# Patient Record
Sex: MALE | Race: WHITE | NOT HISPANIC OR LATINO | URBAN - METROPOLITAN AREA
[De-identification: names, ages, dates, MRNs, and addresses within clinical notes are randomized per-mention and may not be internally consistent; named-entity substitution may affect disease eponyms.]

---

## 2018-07-07 ENCOUNTER — EMERGENCY (EMERGENCY)
Facility: HOSPITAL | Age: 23
LOS: 1 days | Discharge: HOME | End: 2018-07-07
Attending: EMERGENCY MEDICINE
Payer: COMMERCIAL

## 2018-07-07 VITALS
HEART RATE: 53 BPM | SYSTOLIC BLOOD PRESSURE: 118 MMHG | OXYGEN SATURATION: 100 % | DIASTOLIC BLOOD PRESSURE: 71 MMHG | RESPIRATION RATE: 18 BRPM | TEMPERATURE: 98 F

## 2018-07-07 VITALS
RESPIRATION RATE: 18 BRPM | DIASTOLIC BLOOD PRESSURE: 73 MMHG | HEART RATE: 55 BPM | TEMPERATURE: 98 F | WEIGHT: 210.1 LBS | OXYGEN SATURATION: 99 % | SYSTOLIC BLOOD PRESSURE: 122 MMHG | HEIGHT: 69 IN

## 2018-07-07 LAB
ALBUMIN SERPL ELPH-MCNC: 4.7 G/DL — SIGNIFICANT CHANGE UP (ref 3.3–5)
ALP SERPL-CCNC: 97 U/L — SIGNIFICANT CHANGE UP (ref 40–120)
ALT FLD-CCNC: 23 U/L — SIGNIFICANT CHANGE UP (ref 10–45)
ANION GAP SERPL CALC-SCNC: 14 MMOL/L — SIGNIFICANT CHANGE UP (ref 5–17)
AST SERPL-CCNC: 19 U/L — SIGNIFICANT CHANGE UP (ref 10–40)
BASOPHILS # BLD AUTO: 0 K/UL — SIGNIFICANT CHANGE UP (ref 0–0.2)
BASOPHILS NFR BLD AUTO: 0.6 % — SIGNIFICANT CHANGE UP (ref 0–2)
BILIRUB SERPL-MCNC: 1.1 MG/DL — SIGNIFICANT CHANGE UP (ref 0.2–1.2)
BUN SERPL-MCNC: 15 MG/DL — SIGNIFICANT CHANGE UP (ref 7–23)
CALCIUM SERPL-MCNC: 9.7 MG/DL — SIGNIFICANT CHANGE UP (ref 8.4–10.5)
CHLORIDE SERPL-SCNC: 102 MMOL/L — SIGNIFICANT CHANGE UP (ref 96–108)
CO2 SERPL-SCNC: 25 MMOL/L — SIGNIFICANT CHANGE UP (ref 22–31)
CREAT SERPL-MCNC: 1.12 MG/DL — SIGNIFICANT CHANGE UP (ref 0.5–1.3)
EOSINOPHIL # BLD AUTO: 0.3 K/UL — SIGNIFICANT CHANGE UP (ref 0–0.5)
EOSINOPHIL NFR BLD AUTO: 3.3 % — SIGNIFICANT CHANGE UP (ref 0–6)
GLUCOSE SERPL-MCNC: 85 MG/DL — SIGNIFICANT CHANGE UP (ref 70–99)
HCT VFR BLD CALC: 44.9 % — SIGNIFICANT CHANGE UP (ref 39–50)
HGB BLD-MCNC: 15.7 G/DL — SIGNIFICANT CHANGE UP (ref 13–17)
LIDOCAIN IGE QN: 16 U/L — SIGNIFICANT CHANGE UP (ref 7–60)
LYMPHOCYTES # BLD AUTO: 2.6 K/UL — SIGNIFICANT CHANGE UP (ref 1–3.3)
LYMPHOCYTES # BLD AUTO: 32.3 % — SIGNIFICANT CHANGE UP (ref 13–44)
MCHC RBC-ENTMCNC: 31.8 PG — SIGNIFICANT CHANGE UP (ref 27–34)
MCHC RBC-ENTMCNC: 35 GM/DL — SIGNIFICANT CHANGE UP (ref 32–36)
MCV RBC AUTO: 91 FL — SIGNIFICANT CHANGE UP (ref 80–100)
MONOCYTES # BLD AUTO: 0.5 K/UL — SIGNIFICANT CHANGE UP (ref 0–0.9)
MONOCYTES NFR BLD AUTO: 6.5 % — SIGNIFICANT CHANGE UP (ref 2–14)
NEUTROPHILS # BLD AUTO: 4.7 K/UL — SIGNIFICANT CHANGE UP (ref 1.8–7.4)
NEUTROPHILS NFR BLD AUTO: 57.3 % — SIGNIFICANT CHANGE UP (ref 43–77)
PLATELET # BLD AUTO: 192 K/UL — SIGNIFICANT CHANGE UP (ref 150–400)
POTASSIUM SERPL-MCNC: 3.6 MMOL/L — SIGNIFICANT CHANGE UP (ref 3.5–5.3)
POTASSIUM SERPL-SCNC: 3.6 MMOL/L — SIGNIFICANT CHANGE UP (ref 3.5–5.3)
PROT SERPL-MCNC: 7.7 G/DL — SIGNIFICANT CHANGE UP (ref 6–8.3)
RBC # BLD: 4.94 M/UL — SIGNIFICANT CHANGE UP (ref 4.2–5.8)
RBC # FLD: 11 % — SIGNIFICANT CHANGE UP (ref 10.3–14.5)
SODIUM SERPL-SCNC: 141 MMOL/L — SIGNIFICANT CHANGE UP (ref 135–145)
WBC # BLD: 8.2 K/UL — SIGNIFICANT CHANGE UP (ref 3.8–10.5)
WBC # FLD AUTO: 8.2 K/UL — SIGNIFICANT CHANGE UP (ref 3.8–10.5)

## 2018-07-07 PROCEDURE — 99284 EMERGENCY DEPT VISIT MOD MDM: CPT | Mod: 25

## 2018-07-07 PROCEDURE — 99284 EMERGENCY DEPT VISIT MOD MDM: CPT

## 2018-07-07 PROCEDURE — 96374 THER/PROPH/DIAG INJ IV PUSH: CPT

## 2018-07-07 PROCEDURE — 85027 COMPLETE CBC AUTOMATED: CPT

## 2018-07-07 PROCEDURE — 80053 COMPREHEN METABOLIC PANEL: CPT

## 2018-07-07 PROCEDURE — 83690 ASSAY OF LIPASE: CPT

## 2018-07-07 PROCEDURE — 96375 TX/PRO/DX INJ NEW DRUG ADDON: CPT

## 2018-07-07 RX ORDER — PANTOPRAZOLE SODIUM 20 MG/1
40 TABLET, DELAYED RELEASE ORAL ONCE
Qty: 0 | Refills: 0 | Status: COMPLETED | OUTPATIENT
Start: 2018-07-07 | End: 2018-07-07

## 2018-07-07 RX ORDER — ONDANSETRON 8 MG/1
4 TABLET, FILM COATED ORAL ONCE
Qty: 0 | Refills: 0 | Status: COMPLETED | OUTPATIENT
Start: 2018-07-07 | End: 2018-07-07

## 2018-07-07 RX ORDER — FAMOTIDINE 10 MG/ML
20 INJECTION INTRAVENOUS ONCE
Qty: 0 | Refills: 0 | Status: COMPLETED | OUTPATIENT
Start: 2018-07-07 | End: 2018-07-07

## 2018-07-07 RX ORDER — SODIUM CHLORIDE 9 MG/ML
2000 INJECTION INTRAMUSCULAR; INTRAVENOUS; SUBCUTANEOUS ONCE
Qty: 0 | Refills: 0 | Status: COMPLETED | OUTPATIENT
Start: 2018-07-07 | End: 2018-07-07

## 2018-07-07 RX ADMIN — ONDANSETRON 4 MILLIGRAM(S): 8 TABLET, FILM COATED ORAL at 17:25

## 2018-07-07 RX ADMIN — PANTOPRAZOLE SODIUM 40 MILLIGRAM(S): 20 TABLET, DELAYED RELEASE ORAL at 17:55

## 2018-07-07 RX ADMIN — SODIUM CHLORIDE 2000 MILLILITER(S): 9 INJECTION INTRAMUSCULAR; INTRAVENOUS; SUBCUTANEOUS at 17:26

## 2018-07-07 RX ADMIN — FAMOTIDINE 20 MILLIGRAM(S): 10 INJECTION INTRAVENOUS at 17:25

## 2018-07-07 NOTE — ED PROVIDER NOTE - PLAN OF CARE
1) Please return to the ED should you have any new or worsening symptoms, worsening pain, develop fever or any concerning symptoms  2) Please follow up with gastroenterology in 3-4 days. Please call [phone] to make an appointment.   3) Please  Prilosec from pharmacy - please take as directed for 2 weeks. Please discuss continuing this medication with your primary doctor.

## 2018-07-07 NOTE — ED ADULT NURSE REASSESSMENT NOTE - NS ED NURSE REASSESS COMMENT FT1
Patient states that he is feelig better. PO challenge requested by MD Chao. Second liter of NS infusing. Will continue to monitor.

## 2018-07-07 NOTE — ED PROVIDER NOTE - PROGRESS NOTE DETAILS
Appendicitis precautions discussed with patie/ and or family. Pt aware of s/s for return visit. The patient was re-examined after interventions and is feeling much better.  The patient will follow up with their primary physician this week. Appendicitis precautions discussed with patient. Pt aware of s/s for return visit.

## 2018-07-07 NOTE — ED PROVIDER NOTE - CARE PLAN
Principal Discharge DX:	Nausea and vomiting, intractability of vomiting not specified, unspecified vomiting type Principal Discharge DX:	Nausea and vomiting, intractability of vomiting not specified, unspecified vomiting type  Assessment and plan of treatment:	1) Please return to the ED should you have any new or worsening symptoms, worsening pain, develop fever or any concerning symptoms  2) Please follow up with gastroenterology in 3-4 days. Please call [phone] to make an appointment.   3) Please  Prilosec from pharmacy - please take as directed for 2 weeks. Please discuss continuing this medication with your primary doctor.

## 2018-07-07 NOTE — ED PROVIDER NOTE - MEDICAL DECISION MAKING DETAILS
Stephen Barrett (Resident): 21 y/o male p/w 2-3 weeks of abd pain and early satiety, now with vomiting for 1 week - looks well, no infectious symptoms like fever, blood in stool - abd benign w/ some mild abd tenderness in lower quadrants - doesn't smoke marijuana regularly, low suspicion for cyclic vomiting or hyperemesis canabinoid - d/w patient will need GI follow up, and given hx and exam, no need for CT at this time - will check labs, hydrate, symptoms control, and reassess Stephen Barrett (Resident): 21 y/o male p/w 2-3 weeks of abd pain and early satiety, now with vomiting for 1 week - looks well, no infectious symptoms like fever, blood in stool - abd benign w/ some mild abd tenderness in lower quadrants - doesn't smoke marijuana regularly, low suspicion for cyclic vomiting or hyperemesis canabinoid - d/w patient will need GI follow up, and given hx and exam, no need for CT at this time - will check labs, hydrate, symptoms control, and reassess    fransisca 3 weeks of dec appetite and early  satiety  now with 1.5 weeks of post prandial vomiting and abd pain intermittent -abd nonsurgical - pe and history not c/w appy - 1 episode of blood streaked vomit - possible gastritis - will need gi fu - ck lfts and lipase r/o hepatitis and pancreatitis - iv fluids antiemetics and symptom control will dc on h2 blocker

## 2018-07-07 NOTE — ED ADULT NURSE NOTE - OBJECTIVE STATEMENT
Pt is an ambulatory 22 yr old male a/oX 3 c/o abdominal pain and vomitting after eating for 3 weeks.  Pt has not had any workup outpatient.  Perrl wnl, flores with equal strength.  LUNGS CTA no chest pain or sob.  Pt states he vomits after eating and had blood in vomit once last week.  No fevers, chills.  Abdomen ND, tender in RLQ with BS+4Q.  No CVA tenderness.  No urinary symptoms.  SKIN WDI.  Peripheral pulses +2bl no edema

## 2018-07-07 NOTE — ED PROVIDER NOTE - OBJECTIVE STATEMENT
21 y/o male hx of asthma presents with abd pain. Per patient for past 2-3 weeks been having early satiety, abd pain, diffuse. States 1 week ago he started developing some vomiting with meals. Currently has no pain. No hx of abd surgeries in the past. No diarrhea, fever, chills. Normal BM. Does report occasional blood streaked vomiting. Has not taken anything for symptoms. Never seen by GI. States smokes marijuana, but very rarely. Not a drinker.

## 2018-07-07 NOTE — ED PROVIDER NOTE - CONSTITUTIONAL, MLM
normal... Well appearing, well nourished, awake, alert, oriented to person, place, time/situation and in no apparent distress. Not actively retching/vomiting.

## 2018-11-24 ENCOUNTER — EMERGENCY (EMERGENCY)
Facility: HOSPITAL | Age: 23
LOS: 1 days | Discharge: HOME | End: 2018-11-24
Attending: EMERGENCY MEDICINE
Payer: COMMERCIAL

## 2018-11-24 VITALS
SYSTOLIC BLOOD PRESSURE: 114 MMHG | TEMPERATURE: 98 F | RESPIRATION RATE: 16 BRPM | OXYGEN SATURATION: 100 % | DIASTOLIC BLOOD PRESSURE: 64 MMHG | HEART RATE: 65 BPM

## 2018-11-24 VITALS
WEIGHT: 195.11 LBS | HEIGHT: 69 IN | DIASTOLIC BLOOD PRESSURE: 84 MMHG | SYSTOLIC BLOOD PRESSURE: 135 MMHG | HEART RATE: 98 BPM | RESPIRATION RATE: 18 BRPM

## 2018-11-24 LAB
ALBUMIN SERPL ELPH-MCNC: 4.9 G/DL — SIGNIFICANT CHANGE UP (ref 3.3–5)
ALP SERPL-CCNC: 94 U/L — SIGNIFICANT CHANGE UP (ref 40–120)
ALT FLD-CCNC: 30 U/L — SIGNIFICANT CHANGE UP (ref 10–45)
ANION GAP SERPL CALC-SCNC: 19 MMOL/L — HIGH (ref 5–17)
AST SERPL-CCNC: 41 U/L — HIGH (ref 10–40)
BASOPHILS # BLD AUTO: 0.1 K/UL — SIGNIFICANT CHANGE UP (ref 0–0.2)
BASOPHILS NFR BLD AUTO: 0.8 % — SIGNIFICANT CHANGE UP (ref 0–2)
BILIRUB SERPL-MCNC: 1.1 MG/DL — SIGNIFICANT CHANGE UP (ref 0.2–1.2)
BUN SERPL-MCNC: 23 MG/DL — SIGNIFICANT CHANGE UP (ref 7–23)
CALCIUM SERPL-MCNC: 9.8 MG/DL — SIGNIFICANT CHANGE UP (ref 8.4–10.5)
CHLORIDE SERPL-SCNC: 101 MMOL/L — SIGNIFICANT CHANGE UP (ref 96–108)
CO2 SERPL-SCNC: 20 MMOL/L — LOW (ref 22–31)
CREAT SERPL-MCNC: 0.93 MG/DL — SIGNIFICANT CHANGE UP (ref 0.5–1.3)
EOSINOPHIL # BLD AUTO: 0.1 K/UL — SIGNIFICANT CHANGE UP (ref 0–0.5)
EOSINOPHIL NFR BLD AUTO: 1.4 % — SIGNIFICANT CHANGE UP (ref 0–6)
GAS PNL BLDV: SIGNIFICANT CHANGE UP
GLUCOSE SERPL-MCNC: 69 MG/DL — LOW (ref 70–99)
HCT VFR BLD CALC: 49.7 % — SIGNIFICANT CHANGE UP (ref 39–50)
HGB BLD-MCNC: 16.8 G/DL — SIGNIFICANT CHANGE UP (ref 13–17)
LIDOCAIN IGE QN: 24 U/L — SIGNIFICANT CHANGE UP (ref 7–60)
LYMPHOCYTES # BLD AUTO: 2.2 K/UL — SIGNIFICANT CHANGE UP (ref 1–3.3)
LYMPHOCYTES # BLD AUTO: 27.1 % — SIGNIFICANT CHANGE UP (ref 13–44)
MCHC RBC-ENTMCNC: 29.9 PG — SIGNIFICANT CHANGE UP (ref 27–34)
MCHC RBC-ENTMCNC: 33.8 GM/DL — SIGNIFICANT CHANGE UP (ref 32–36)
MCV RBC AUTO: 88.4 FL — SIGNIFICANT CHANGE UP (ref 80–100)
MONOCYTES # BLD AUTO: 0.5 K/UL — SIGNIFICANT CHANGE UP (ref 0–0.9)
MONOCYTES NFR BLD AUTO: 6.4 % — SIGNIFICANT CHANGE UP (ref 2–14)
NEUTROPHILS # BLD AUTO: 5.2 K/UL — SIGNIFICANT CHANGE UP (ref 1.8–7.4)
NEUTROPHILS NFR BLD AUTO: 64.3 % — SIGNIFICANT CHANGE UP (ref 43–77)
PLATELET # BLD AUTO: 223 K/UL — SIGNIFICANT CHANGE UP (ref 150–400)
POTASSIUM SERPL-MCNC: 6.3 MMOL/L — CRITICAL HIGH (ref 3.5–5.3)
POTASSIUM SERPL-SCNC: 6.3 MMOL/L — CRITICAL HIGH (ref 3.5–5.3)
PROT SERPL-MCNC: 8.5 G/DL — HIGH (ref 6–8.3)
RBC # BLD: 5.62 M/UL — SIGNIFICANT CHANGE UP (ref 4.2–5.8)
RBC # FLD: 12 % — SIGNIFICANT CHANGE UP (ref 10.3–14.5)
SODIUM SERPL-SCNC: 140 MMOL/L — SIGNIFICANT CHANGE UP (ref 135–145)
WBC # BLD: 8.2 K/UL — SIGNIFICANT CHANGE UP (ref 3.8–10.5)
WBC # FLD AUTO: 8.2 K/UL — SIGNIFICANT CHANGE UP (ref 3.8–10.5)

## 2018-11-24 PROCEDURE — 82435 ASSAY OF BLOOD CHLORIDE: CPT

## 2018-11-24 PROCEDURE — 96374 THER/PROPH/DIAG INJ IV PUSH: CPT

## 2018-11-24 PROCEDURE — 83690 ASSAY OF LIPASE: CPT

## 2018-11-24 PROCEDURE — 85014 HEMATOCRIT: CPT

## 2018-11-24 PROCEDURE — 96375 TX/PRO/DX INJ NEW DRUG ADDON: CPT

## 2018-11-24 PROCEDURE — 71045 X-RAY EXAM CHEST 1 VIEW: CPT | Mod: 26

## 2018-11-24 PROCEDURE — 82803 BLOOD GASES ANY COMBINATION: CPT

## 2018-11-24 PROCEDURE — 71045 X-RAY EXAM CHEST 1 VIEW: CPT

## 2018-11-24 PROCEDURE — 84132 ASSAY OF SERUM POTASSIUM: CPT

## 2018-11-24 PROCEDURE — 82947 ASSAY GLUCOSE BLOOD QUANT: CPT

## 2018-11-24 PROCEDURE — 99284 EMERGENCY DEPT VISIT MOD MDM: CPT

## 2018-11-24 PROCEDURE — 85027 COMPLETE CBC AUTOMATED: CPT

## 2018-11-24 PROCEDURE — 80053 COMPREHEN METABOLIC PANEL: CPT

## 2018-11-24 PROCEDURE — 84295 ASSAY OF SERUM SODIUM: CPT

## 2018-11-24 PROCEDURE — 99284 EMERGENCY DEPT VISIT MOD MDM: CPT | Mod: 25

## 2018-11-24 PROCEDURE — 82330 ASSAY OF CALCIUM: CPT

## 2018-11-24 PROCEDURE — 83605 ASSAY OF LACTIC ACID: CPT

## 2018-11-24 PROCEDURE — 96361 HYDRATE IV INFUSION ADD-ON: CPT

## 2018-11-24 RX ORDER — SUCRALFATE 1 G
1 TABLET ORAL ONCE
Qty: 0 | Refills: 0 | Status: COMPLETED | OUTPATIENT
Start: 2018-11-24 | End: 2018-11-24

## 2018-11-24 RX ORDER — SODIUM CHLORIDE 9 MG/ML
1000 INJECTION, SOLUTION INTRAVENOUS
Qty: 0 | Refills: 0 | Status: DISCONTINUED | OUTPATIENT
Start: 2018-11-24 | End: 2018-11-28

## 2018-11-24 RX ORDER — SUCRALFATE 1 G
10 TABLET ORAL
Qty: 500 | Refills: 0
Start: 2018-11-24

## 2018-11-24 RX ORDER — SODIUM CHLORIDE 9 MG/ML
1000 INJECTION INTRAMUSCULAR; INTRAVENOUS; SUBCUTANEOUS ONCE
Qty: 0 | Refills: 0 | Status: COMPLETED | OUTPATIENT
Start: 2018-11-24 | End: 2018-11-24

## 2018-11-24 RX ORDER — OMEPRAZOLE 10 MG/1
1 CAPSULE, DELAYED RELEASE ORAL
Qty: 30 | Refills: 0
Start: 2018-11-24 | End: 2018-12-23

## 2018-11-24 RX ORDER — ONDANSETRON 8 MG/1
4 TABLET, FILM COATED ORAL ONCE
Qty: 0 | Refills: 0 | Status: COMPLETED | OUTPATIENT
Start: 2018-11-24 | End: 2018-11-24

## 2018-11-24 RX ORDER — ONDANSETRON 8 MG/1
1 TABLET, FILM COATED ORAL
Qty: 12 | Refills: 0
Start: 2018-11-24

## 2018-11-24 RX ORDER — FAMOTIDINE 10 MG/ML
20 INJECTION INTRAVENOUS ONCE
Qty: 0 | Refills: 0 | Status: COMPLETED | OUTPATIENT
Start: 2018-11-24 | End: 2018-11-24

## 2018-11-24 RX ORDER — FAMOTIDINE 10 MG/ML
1 INJECTION INTRAVENOUS
Qty: 28 | Refills: 0
Start: 2018-11-24 | End: 2018-12-07

## 2018-11-24 RX ADMIN — SODIUM CHLORIDE 1000 MILLILITER(S): 9 INJECTION INTRAMUSCULAR; INTRAVENOUS; SUBCUTANEOUS at 20:08

## 2018-11-24 RX ADMIN — ONDANSETRON 4 MILLIGRAM(S): 8 TABLET, FILM COATED ORAL at 18:27

## 2018-11-24 RX ADMIN — SODIUM CHLORIDE 1000 MILLILITER(S): 9 INJECTION INTRAMUSCULAR; INTRAVENOUS; SUBCUTANEOUS at 19:48

## 2018-11-24 RX ADMIN — FAMOTIDINE 20 MILLIGRAM(S): 10 INJECTION INTRAVENOUS at 18:29

## 2018-11-24 RX ADMIN — SODIUM CHLORIDE 1000 MILLILITER(S): 9 INJECTION INTRAMUSCULAR; INTRAVENOUS; SUBCUTANEOUS at 18:27

## 2018-11-24 RX ADMIN — SODIUM CHLORIDE 150 MILLILITER(S): 9 INJECTION, SOLUTION INTRAVENOUS at 20:04

## 2018-11-24 RX ADMIN — Medication 1 GRAM(S): at 20:11

## 2018-11-24 NOTE — ED ADULT NURSE NOTE - OBJECTIVE STATEMENT
pt has been vomiting for 36 hours.  he had fever a day ago but none now.  he has a history  of asthma and dehydration.  refill wdl

## 2018-11-24 NOTE — ED PROVIDER NOTE - ATTENDING CONTRIBUTION TO CARE
22y/o M with h/o asthma, gastritis, c/o worsening epigastric abdominal pain, nausea, vomiting and inability to tolerate even small amount of water over the past 4 days.  Any po intake reportedly causing pain and vomiting since yesterday.  Had previously some chills and diarrhea few days ago prior to other symptom onset.  No known sick contacts.  No recent travel.  No medication use (was previously on PPI for gastritis after EGD by his GI doctor diagnosed gastritis, but d/c'd after became asymptomatic for few months and clear by GI).      On Physical Exam:  General: appears clinically dehydrated (dry lips/mouth) but in NAD, speaking clearly in full sentences and without difficulty; cooperative with exam  HEENT: PERRL, dry lips, oropharynx clear, mild erythema to posterior oropharynx (likely irritant from vomiting) without exudates, tonsillar enlargement or vesicles  Neck: no neck tenderness, no nuchal rigidity  Cardiac: normal s1, s2; RRR; no MGR  Lungs: CTABL  Abdomen: mild epigastric tenderness without rebound or guarding, no RUQ tenderness, negative erickson's sign; normal BS in all 4 quadrants.  : no bladder tenderness or distension  Skin: intact, no rash  Extremities: no peripheral edema, no gross deformities  Neuro: no gross neurologic deficits     AP: Likely gastritis; will check labs: cbc (r/o anemia which would suggest bleeding gastric/peptic ulcer), cmp, lipase (r/o pancreatitis); obtain CXR to eval for pneumomediastinum given repeated vomiting, and will give medications (iv fluids for dehydration, GI medications) and reassess.     ED Course: Patient remained hemodynamically stable, and reported feeling much better after medication.  Additionally, labs notable for mild hyperkalemia on CMP (hemolyzed) with normal renal function and K 3.4 on VBG (likely not truly hyperkalemic).  CXR shows no evidence of pneumomediastinum.   Mild AG elevation and decreased bicarb c/w dehydration and poor po intake.  Mild hypoglycemia, but is no AMS to suggest he is symptomatic from it.  Lipase WNL, pancreatitis very unlikely.  No significant anemia, so bleeding gastric ulcer very unlikely.    Plan now for discharge with continued pepcid as bridge to PPI and prn zofran, carafate; to f/u with his GI doctor.  Results of ED evaluation including labs and x-ray d/w patient, who verbalizes understanding of ED evaluation and discharge plan including medications, follow-up instructions and return precautions.

## 2018-11-24 NOTE — ED PROVIDER NOTE - MEDICAL DECISION MAKING DETAILS
23y m w PMHx gastritis, p/w n/v and abd pain over the last week, has associated body aches as well. Mult episodes of vomiting a day, unable to tolerate PO intake for two days. DDx recurrent gastritis vs gastroenteritis. Pt likely dehydrated, will admin fluids and symptomatic control, check labs including lipase and lactate. If pt clinically improves and no significant lab abnormalities will likely dc home w/ instrx for GI f/u -[place]

## 2018-11-24 NOTE — ED PROVIDER NOTE - PROGRESS NOTE DETAILS
Attending note (Bautista): Patient reports feeling much better after iv medication.  Have also ordered carafate and then will po trial.  Additionally, labs notable for mild hyperkalemia on CMP (hemolyzed) with normal renal function and K 3.4 on VBG (likely not truly hyperkalemic).  CXR shows pneumomediastinum.   Mild AG elevation and decreased bicarb c/w dehydration and poor po intake.  Mild hypoglycemia, but is no AMS to suggest he is symptomatic from it.  Have continued patient on iv fluids with D5 1/2NS @ 150cc/hr and am continuing to monitor.  If continues to improve and is able to tolerate po, will be stable for dc.  Lipase WNL, pancreatitis very unlikely.  No significant anemia, so bleeding gastric ulcer very unlikely.  -Bautista Pt tolerating PO trial. Stable for dc w/ GI f/u Attending note (Bautista): Patient reports feeling much better after iv medication.  Have also ordered carafate and then will po trial.  Additionally, labs notable for mild hyperkalemia on CMP (hemolyzed) with normal renal function and K 3.4 on VBG (likely not truly hyperkalemic).  CXR shows no evidence of pneumomediastinum.   Mild AG elevation and decreased bicarb c/w dehydration and poor po intake.  Mild hypoglycemia, but is no AMS to suggest he is symptomatic from it.  Have continued patient on iv fluids with D5 1/2NS @ 150cc/hr and am continuing to monitor.  If continues to improve and is able to tolerate po, will be stable for dc.  Lipase WNL, pancreatitis very unlikely.  No significant anemia, so bleeding gastric ulcer very unlikely.  -Bautista

## 2018-11-24 NOTE — ED PROVIDER NOTE - OBJECTIVE STATEMENT
23y m w PMHx gastritis diagnosed 4 months ago w/ EGD by GI, was formerly on an unknown medication (likely PPI) but improved after 1 month and was told to follow up only if symptoms return, now p/w nausea/vomiting and epigastric abdominal pain over the last 6 days. Pt has had multiple episodes of vomiting every day, upwards of 15-20 times a day; he has been unable to tolerate PO intake for the last two days. He last threw up 1 hr ago.

## 2018-12-31 NOTE — ED ADULT NURSE NOTE - DATE/TIME PROVIDED
Encounter addended by: Elyse Costello OTR on: 12/31/2018 4:24 PM   Actions taken: Flowsheet accepted 07-Jul-2018 18:31

## 2020-11-30 ENCOUNTER — INPATIENT (INPATIENT)
Facility: HOSPITAL | Age: 25
LOS: 2 days | Discharge: HOME | DRG: 395 | End: 2020-12-03
Attending: SURGERY | Admitting: SURGERY
Payer: COMMERCIAL

## 2020-11-30 VITALS
HEART RATE: 68 BPM | OXYGEN SATURATION: 98 % | SYSTOLIC BLOOD PRESSURE: 147 MMHG | RESPIRATION RATE: 18 BRPM | HEIGHT: 69 IN | TEMPERATURE: 99 F | DIASTOLIC BLOOD PRESSURE: 78 MMHG

## 2020-11-30 LAB
ALBUMIN SERPL ELPH-MCNC: 4.5 G/DL — SIGNIFICANT CHANGE UP (ref 3.3–5)
ALP SERPL-CCNC: 99 U/L — SIGNIFICANT CHANGE UP (ref 40–120)
ALT FLD-CCNC: 61 U/L — HIGH (ref 10–45)
ANION GAP SERPL CALC-SCNC: 11 MMOL/L — SIGNIFICANT CHANGE UP (ref 5–17)
AST SERPL-CCNC: 22 U/L — SIGNIFICANT CHANGE UP (ref 10–40)
BASOPHILS # BLD AUTO: 0.03 K/UL — SIGNIFICANT CHANGE UP (ref 0–0.2)
BASOPHILS NFR BLD AUTO: 0.3 % — SIGNIFICANT CHANGE UP (ref 0–2)
BILIRUB SERPL-MCNC: 0.3 MG/DL — SIGNIFICANT CHANGE UP (ref 0.2–1.2)
BUN SERPL-MCNC: 19 MG/DL — SIGNIFICANT CHANGE UP (ref 7–23)
CALCIUM SERPL-MCNC: 9.8 MG/DL — SIGNIFICANT CHANGE UP (ref 8.4–10.5)
CHLORIDE SERPL-SCNC: 104 MMOL/L — SIGNIFICANT CHANGE UP (ref 96–108)
CO2 SERPL-SCNC: 25 MMOL/L — SIGNIFICANT CHANGE UP (ref 22–31)
CREAT SERPL-MCNC: 0.98 MG/DL — SIGNIFICANT CHANGE UP (ref 0.5–1.3)
EOSINOPHIL # BLD AUTO: 0.17 K/UL — SIGNIFICANT CHANGE UP (ref 0–0.5)
EOSINOPHIL NFR BLD AUTO: 1.9 % — SIGNIFICANT CHANGE UP (ref 0–6)
GLUCOSE SERPL-MCNC: 87 MG/DL — SIGNIFICANT CHANGE UP (ref 70–99)
HCT VFR BLD CALC: 42.9 % — SIGNIFICANT CHANGE UP (ref 39–50)
HGB BLD-MCNC: 14.2 G/DL — SIGNIFICANT CHANGE UP (ref 13–17)
IMM GRANULOCYTES NFR BLD AUTO: 0.4 % — SIGNIFICANT CHANGE UP (ref 0–1.5)
LIDOCAIN IGE QN: 17 U/L — SIGNIFICANT CHANGE UP (ref 7–60)
LYMPHOCYTES # BLD AUTO: 2.9 K/UL — SIGNIFICANT CHANGE UP (ref 1–3.3)
LYMPHOCYTES # BLD AUTO: 32.4 % — SIGNIFICANT CHANGE UP (ref 13–44)
MCHC RBC-ENTMCNC: 30 PG — SIGNIFICANT CHANGE UP (ref 27–34)
MCHC RBC-ENTMCNC: 33.1 GM/DL — SIGNIFICANT CHANGE UP (ref 32–36)
MCV RBC AUTO: 90.5 FL — SIGNIFICANT CHANGE UP (ref 80–100)
MONOCYTES # BLD AUTO: 0.73 K/UL — SIGNIFICANT CHANGE UP (ref 0–0.9)
MONOCYTES NFR BLD AUTO: 8.1 % — SIGNIFICANT CHANGE UP (ref 2–14)
NEUTROPHILS # BLD AUTO: 5.09 K/UL — SIGNIFICANT CHANGE UP (ref 1.8–7.4)
NEUTROPHILS NFR BLD AUTO: 56.9 % — SIGNIFICANT CHANGE UP (ref 43–77)
NRBC # BLD: 0 /100 WBCS — SIGNIFICANT CHANGE UP (ref 0–0)
PLATELET # BLD AUTO: 228 K/UL — SIGNIFICANT CHANGE UP (ref 150–400)
POTASSIUM SERPL-MCNC: 3.6 MMOL/L — SIGNIFICANT CHANGE UP (ref 3.5–5.3)
POTASSIUM SERPL-SCNC: 3.6 MMOL/L — SIGNIFICANT CHANGE UP (ref 3.5–5.3)
PROT SERPL-MCNC: 7.4 G/DL — SIGNIFICANT CHANGE UP (ref 6–8.3)
RBC # BLD: 4.74 M/UL — SIGNIFICANT CHANGE UP (ref 4.2–5.8)
RBC # FLD: 11.9 % — SIGNIFICANT CHANGE UP (ref 10.3–14.5)
SODIUM SERPL-SCNC: 140 MMOL/L — SIGNIFICANT CHANGE UP (ref 135–145)
WBC # BLD: 8.96 K/UL — SIGNIFICANT CHANGE UP (ref 3.8–10.5)
WBC # FLD AUTO: 8.96 K/UL — SIGNIFICANT CHANGE UP (ref 3.8–10.5)

## 2020-11-30 PROCEDURE — 74177 CT ABD & PELVIS W/CONTRAST: CPT | Mod: 26

## 2020-11-30 PROCEDURE — 99285 EMERGENCY DEPT VISIT HI MDM: CPT

## 2020-11-30 RX ORDER — SODIUM CHLORIDE 9 MG/ML
1000 INJECTION INTRAMUSCULAR; INTRAVENOUS; SUBCUTANEOUS ONCE
Refills: 0 | Status: COMPLETED | OUTPATIENT
Start: 2020-11-30 | End: 2020-11-30

## 2020-11-30 RX ORDER — ACETAMINOPHEN 500 MG
650 TABLET ORAL ONCE
Refills: 0 | Status: COMPLETED | OUTPATIENT
Start: 2020-11-30 | End: 2020-11-30

## 2020-11-30 RX ORDER — KETOROLAC TROMETHAMINE 30 MG/ML
30 SYRINGE (ML) INJECTION ONCE
Refills: 0 | Status: DISCONTINUED | OUTPATIENT
Start: 2020-11-30 | End: 2020-11-30

## 2020-11-30 RX ADMIN — SODIUM CHLORIDE 1000 MILLILITER(S): 9 INJECTION INTRAMUSCULAR; INTRAVENOUS; SUBCUTANEOUS at 23:54

## 2020-11-30 RX ADMIN — Medication 650 MILLIGRAM(S): at 23:53

## 2020-11-30 RX ADMIN — Medication 30 MILLIGRAM(S): at 23:53

## 2020-11-30 NOTE — ED ADULT NURSE NOTE - CHPI ED NUR SYMPTOMS NEG
no abdominal distension/no fever/no blood in stool/no burning urination/no nausea/no diarrhea/no vomiting/no dysuria/no hematuria/no chills

## 2020-11-30 NOTE — ED PROVIDER NOTE - OBJECTIVE STATEMENT
24y/o M w/ h/o asthma, gastritis, recent COVID+ test p/w RLQ pain. Pt states that he had gradual onset abdominal pain that is worse with movement. Denies f/c/n/v/d, anorexia. No cough or SOB. Denies testicular pain. 26y/o M w/ h/o asthma, gastritis, recent COVID+ test p/w RLQ pain. Pt states that he had gradual onset abdominal pain that is worse with movement. Not a/w food intake. Tried taking maalox with no relief. Denies f/c/n/v/d, anorexia. No cough or SOB. Denies testicular pain.

## 2020-11-30 NOTE — ED ADULT NURSE NOTE - OBJECTIVE STATEMENT
complaining of RLQ abdominal pain x Saturday. Pt states, "I was swabbed positive on Nov 8 then 2 negative swabs and positive again from my swab Friday. I have this belly pain that's worse when I walk and it started Saturday I had one beer and just wasn't feeling it so I stopped and my parents are afraid I have appendicitis so they made me come to the ER." pt endorses abdominal pain 5/10 at present. Pt denies headache blurred vision. lightheadedness, dizziness, SOB, chest pain, N.V.D urinary symptoms, numbness and tingling at present. -CVA but positive rebound tenderness. complaining of RLQ abdominal pain x Saturday. Pt states, "I was swabbed positive on Nov 8 then 2 negative swabs and positive again from my swab Friday. I have this belly pain that's worse when I walk and it started Saturday I had one beer and just wasn't feeling it so I stopped and my parents are afraid I have appendicitis so they made me come to the ER." Pt endorses abdominal pain 5/10 at present. Pt denies headache blurred vision. lightheadedness, dizziness, SOB, chest pain, N/V/D urinary symptoms, numbness and tingling at present. -CVA, positive rebound tenderness.

## 2020-11-30 NOTE — ED PROVIDER NOTE - PHYSICAL EXAMINATION
GENERAL: non-toxic appearing in NAD  HEAD: normocephalic, atraumatic  HEENT: normal conjunctiva  CARDIAC: regular rate and rhythm, normal S1S2, no appreciable murmurs, no peripheral edema  PULM: normal breath sounds, CTA b/l, no rales, rhonchi, wheezing  GI: abdomen nondistended, soft, RLQ tenderness, no guarding, no rebound tenderness  : no CVA tenderness b/l, no suprapubic tenderness  NEURO: no focal motor or sensory deficits, normal speech, PERRLA, EOMI, normal gait, AAOx3  MSK: no calf tenderness b/l  SKIN: well-perfused, extremities warm, no visible rashes  PSYCH: appropriate mood and affect

## 2020-11-30 NOTE — ED PROVIDER NOTE - ATTENDING CONTRIBUTION TO CARE
Patient known COVID + as outpatient without noted symptoms to date presenting complaining of gradual onset non radiating RLQ pain beginning today.  No noted fevers, chills, no associated nausea, vomiting, diarrhea, no appetite changes, no dysuria.  No prior abdominal surgeries.    A 14 point review of systems is negative except as in HPI or otherwise documented.    Exam:  General: Patient well appearing, vital signs within normal limits  HEENT: airway patent with moist mucous membranes  Cardiac: RRR S1/S2 with strong peripheral pulses  Respiratory: lungs clear without respiratory distress  GI: abdomen soft, tender to palpation in RLQ, + rebound, no guarding  Neuro: no gross neurologic deficits  Skin: warm, well perfused  Psych: normal mood and affect    Possible appendicitis given history and exam - plan for labs, pain management, CT A/P, re-evaluate.

## 2020-11-30 NOTE — ED PROVIDER NOTE - CLINICAL SUMMARY MEDICAL DECISION MAKING FREE TEXT BOX
24y/o M w/ h/o asthma, gastritis, recent COVID+ test p/w RLQ pain. Hemodynamically stable with +rebound tenderness concern for possible appy. Will check basic labs, CTAP. Supportive care and dispo as per imaging.

## 2020-12-01 DIAGNOSIS — K37 UNSPECIFIED APPENDICITIS: ICD-10-CM

## 2020-12-01 PROBLEM — K29.70 GASTRITIS, UNSPECIFIED, WITHOUT BLEEDING: Chronic | Status: ACTIVE | Noted: 2018-11-24

## 2020-12-01 LAB
APPEARANCE UR: CLEAR — SIGNIFICANT CHANGE UP
BACTERIA # UR AUTO: NEGATIVE — SIGNIFICANT CHANGE UP
BILIRUB UR-MCNC: NEGATIVE — SIGNIFICANT CHANGE UP
COLOR SPEC: YELLOW — SIGNIFICANT CHANGE UP
CULTURE RESULTS: SIGNIFICANT CHANGE UP
DIFF PNL FLD: NEGATIVE — SIGNIFICANT CHANGE UP
EPI CELLS # UR: 1 /HPF — SIGNIFICANT CHANGE UP
GLUCOSE UR QL: NEGATIVE — SIGNIFICANT CHANGE UP
HYALINE CASTS # UR AUTO: 0 /LPF — SIGNIFICANT CHANGE UP (ref 0–2)
KETONES UR-MCNC: NEGATIVE — SIGNIFICANT CHANGE UP
LEUKOCYTE ESTERASE UR-ACNC: NEGATIVE — SIGNIFICANT CHANGE UP
NITRITE UR-MCNC: NEGATIVE — SIGNIFICANT CHANGE UP
PH UR: 7 — SIGNIFICANT CHANGE UP (ref 5–8)
PROT UR-MCNC: ABNORMAL
RBC CASTS # UR COMP ASSIST: 2 /HPF — SIGNIFICANT CHANGE UP (ref 0–4)
SP GR SPEC: >1.05 (ref 1.01–1.02)
SPECIMEN SOURCE: SIGNIFICANT CHANGE UP
UROBILINOGEN FLD QL: NEGATIVE — SIGNIFICANT CHANGE UP
WBC UR QL: 1 /HPF — SIGNIFICANT CHANGE UP (ref 0–5)

## 2020-12-01 PROCEDURE — 99255 IP/OBS CONSLTJ NEW/EST HI 80: CPT

## 2020-12-01 PROCEDURE — 99223 1ST HOSP IP/OBS HIGH 75: CPT

## 2020-12-01 RX ORDER — ACETAMINOPHEN 500 MG
975 TABLET ORAL EVERY 6 HOURS
Refills: 0 | Status: DISCONTINUED | OUTPATIENT
Start: 2020-12-01 | End: 2020-12-03

## 2020-12-01 RX ORDER — PIPERACILLIN AND TAZOBACTAM 4; .5 G/20ML; G/20ML
3.38 INJECTION, POWDER, LYOPHILIZED, FOR SOLUTION INTRAVENOUS EVERY 8 HOURS
Refills: 0 | Status: DISCONTINUED | OUTPATIENT
Start: 2020-12-01 | End: 2020-12-01

## 2020-12-01 RX ORDER — DEXTROSE MONOHYDRATE, SODIUM CHLORIDE, AND POTASSIUM CHLORIDE 50; .745; 4.5 G/1000ML; G/1000ML; G/1000ML
1000 INJECTION, SOLUTION INTRAVENOUS
Refills: 0 | Status: DISCONTINUED | OUTPATIENT
Start: 2020-12-01 | End: 2020-12-02

## 2020-12-01 RX ORDER — ENOXAPARIN SODIUM 100 MG/ML
40 INJECTION SUBCUTANEOUS DAILY
Refills: 0 | Status: DISCONTINUED | OUTPATIENT
Start: 2020-12-01 | End: 2020-12-03

## 2020-12-01 RX ORDER — PIPERACILLIN AND TAZOBACTAM 4; .5 G/20ML; G/20ML
3.38 INJECTION, POWDER, LYOPHILIZED, FOR SOLUTION INTRAVENOUS ONCE
Refills: 0 | Status: COMPLETED | OUTPATIENT
Start: 2020-12-01 | End: 2020-12-01

## 2020-12-01 RX ORDER — SODIUM CHLORIDE 9 MG/ML
1000 INJECTION, SOLUTION INTRAVENOUS
Refills: 0 | Status: DISCONTINUED | OUTPATIENT
Start: 2020-12-01 | End: 2020-12-01

## 2020-12-01 RX ORDER — AMPICILLIN SODIUM AND SULBACTAM SODIUM 250; 125 MG/ML; MG/ML
3 INJECTION, POWDER, FOR SUSPENSION INTRAMUSCULAR; INTRAVENOUS EVERY 6 HOURS
Refills: 0 | Status: DISCONTINUED | OUTPATIENT
Start: 2020-12-01 | End: 2020-12-02

## 2020-12-01 RX ORDER — HYDROMORPHONE HYDROCHLORIDE 2 MG/ML
1 INJECTION INTRAMUSCULAR; INTRAVENOUS; SUBCUTANEOUS EVERY 4 HOURS
Refills: 0 | Status: DISCONTINUED | OUTPATIENT
Start: 2020-12-01 | End: 2020-12-02

## 2020-12-01 RX ORDER — HYDROMORPHONE HYDROCHLORIDE 2 MG/ML
0.5 INJECTION INTRAMUSCULAR; INTRAVENOUS; SUBCUTANEOUS EVERY 4 HOURS
Refills: 0 | Status: DISCONTINUED | OUTPATIENT
Start: 2020-12-01 | End: 2020-12-02

## 2020-12-01 RX ORDER — SODIUM CHLORIDE 9 MG/ML
1000 INJECTION, SOLUTION INTRAVENOUS ONCE
Refills: 0 | Status: COMPLETED | OUTPATIENT
Start: 2020-12-01 | End: 2020-12-01

## 2020-12-01 RX ORDER — INFLUENZA VIRUS VACCINE 15; 15; 15; 15 UG/.5ML; UG/.5ML; UG/.5ML; UG/.5ML
0.5 SUSPENSION INTRAMUSCULAR ONCE
Refills: 0 | Status: DISCONTINUED | OUTPATIENT
Start: 2020-12-01 | End: 2020-12-03

## 2020-12-01 RX ADMIN — AMPICILLIN SODIUM AND SULBACTAM SODIUM 200 GRAM(S): 250; 125 INJECTION, POWDER, FOR SUSPENSION INTRAMUSCULAR; INTRAVENOUS at 21:39

## 2020-12-01 RX ADMIN — DEXTROSE MONOHYDRATE, SODIUM CHLORIDE, AND POTASSIUM CHLORIDE 75 MILLILITER(S): 50; .745; 4.5 INJECTION, SOLUTION INTRAVENOUS at 17:26

## 2020-12-01 RX ADMIN — Medication 650 MILLIGRAM(S): at 00:11

## 2020-12-01 RX ADMIN — ENOXAPARIN SODIUM 40 MILLIGRAM(S): 100 INJECTION SUBCUTANEOUS at 08:29

## 2020-12-01 RX ADMIN — SODIUM CHLORIDE 1000 MILLILITER(S): 9 INJECTION, SOLUTION INTRAVENOUS at 01:45

## 2020-12-01 RX ADMIN — AMPICILLIN SODIUM AND SULBACTAM SODIUM 200 GRAM(S): 250; 125 INJECTION, POWDER, FOR SUSPENSION INTRAMUSCULAR; INTRAVENOUS at 15:38

## 2020-12-01 RX ADMIN — SODIUM CHLORIDE 125 MILLILITER(S): 9 INJECTION, SOLUTION INTRAVENOUS at 15:38

## 2020-12-01 RX ADMIN — PIPERACILLIN AND TAZOBACTAM 25 GRAM(S): 4; .5 INJECTION, POWDER, LYOPHILIZED, FOR SOLUTION INTRAVENOUS at 08:29

## 2020-12-01 RX ADMIN — PIPERACILLIN AND TAZOBACTAM 200 GRAM(S): 4; .5 INJECTION, POWDER, LYOPHILIZED, FOR SOLUTION INTRAVENOUS at 00:51

## 2020-12-01 RX ADMIN — SODIUM CHLORIDE 1000 MILLILITER(S): 9 INJECTION INTRAMUSCULAR; INTRAVENOUS; SUBCUTANEOUS at 00:42

## 2020-12-01 RX ADMIN — Medication 30 MILLIGRAM(S): at 00:11

## 2020-12-01 NOTE — H&P ADULT - ASSESSMENT
25M hx asthma, gastritis, COVID19 (11/8/20) presenting with 3 days of abdominal pain found to have acute appendicitis    - Admit to Surgery under Dr. Gonzales  - NPO / IV hydration / Zosyn  - Favor medical management at this time in the setting of recent COVID-19 infection  - Pain control as needed  - VTE ppx: Lovenox, ambulation    d/w Dr. Annie Garcia, PGY-3  General Surgery ([facility])  [id number] with questions

## 2020-12-01 NOTE — CONSULT NOTE ADULT - ASSESSMENT
25 M hx asthma, gastritis, COVID19 (11/8/20) presenting with 3 days of abdominal pain  No fevers, no leukocytosis  COVID+ on 11/8, then positive here  Patient here found on CT to have appendicitis  Planned for conservative management  Overall,  1) Appendicitis  - Not planned for surgery presently  - Unasyn 3g q 6  - DC Zosyn  - F/U surgery team regarding OR planning in the future  - Monitor for further signs/symptoms worsening appy  2) COVID  - Patient with history positive COVID on 11/8, never had symptoms (had asymptomatic checks for work)  - Monitor for any further signs  - From ID perspective can DC isolation as long as infection control in agreement (asymptomatic x >21 days)    Huy Cintron MD  Pager [phone]  After 5pm and on weekends call [phone]

## 2020-12-01 NOTE — CONSULT NOTE ADULT - SUBJECTIVE AND OBJECTIVE BOX
"HPI:  25M hx asthma, gastritis, COVID19 (11/8/20) presenting with 3 days of abdominal pain. Patient reports new onset RLQ pain, worse with ambulation and movement, coming and going. Eating regularly. Denies fever/chills, nausea/vomiting. Patient reports having undergone 4 COVID tests this week (requires negative x2 to return to work), one indeterminant, one negative, one positive, and one pending results. Denies prior abdominal surgeries.  (01 Dec 2020 01:23)"    Above reviewed. Saw/spoke to patient. No fevers, no chills. Patient doing well. Minimal abd pain. No symptoms from COVID. No N/V/D. No dysuria, no pyuria. Patient found to have appendicitis, not planned for surgery presently. Otherwise no new complaints. ID called for further eval.    PAST MEDICAL & SURGICAL HISTORY:  Gastritis    Asthma    No significant past surgical history    Allergies    magnesium sulfate (Other)    Intolerances    ANTIMICROBIALS:  ampicillin/sulbactam  IVPB 3 every 6 hours    OTHER MEDS:  acetaminophen   Tablet .. 975 milliGRAM(s) Oral every 6 hours PRN  enoxaparin Injectable 40 milliGRAM(s) SubCutaneous daily  HYDROmorphone  Injectable 0.5 milliGRAM(s) IV Push every 4 hours PRN  HYDROmorphone  Injectable 1 milliGRAM(s) IV Push every 4 hours PRN  influenza   Vaccine 0.5 milliLiter(s) IntraMuscular once  lactated ringers. 1000 milliLiter(s) IV Continuous <Continuous>    SOCIAL HISTORY: No tobacco, no alcohol, no illicit drugs    FAMILY HISTORY: No pertinent family history relating to chief complaint    Drug Dosing Weight  Height (cm): 175.3 (30 Nov 2020 21:57)    PE:    Vital Signs Last 24 Hrs  T(C): 36.8 (01 Dec 2020 10:44), Max: 37.3 (01 Dec 2020 02:30)  T(F): 98.3 (01 Dec 2020 10:44), Max: 99.1 (01 Dec 2020 02:30)  HR: 70 (01 Dec 2020 10:44) (68 - 92)  BP: 103/72 (01 Dec 2020 10:44) (103/72 - 147/78)  RR: 18 (01 Dec 2020 10:44) (16 - 18)  SpO2: 98% (01 Dec 2020 10:44) (98% - 100%)    Gen: AOx3, NAD, non-toxic  CV: S1+S2 normal, nontachycardic  Resp: Room air, breathing well, no resp distress  Abd: Soft, nontender, +BS  Ext: No LE edema, no wounds  : No suprapubic tenderness  IV/Skin: No thrombophlebitis, no rash  Msk: No low back pain, no arthralgias, no joint swelling  Neuro: No sensory deficits, no motor deficits    LABS:                        14.2   8.96  )-----------( 228      ( 30 Nov 2020 23:11 )             42.9     11-30    140  |  104  |  19  ----------------------------<  87  3.6   |  25  |  0.98    Ca    9.8      30 Nov 2020 23:11    TPro  7.4  /  Alb  4.5  /  TBili  0.3  /  DBili  x   /  AST  22  /  ALT  61<H>  /  AlkPhos  99  11-30    Urinalysis Basic - ( 01 Dec 2020 00:32 )    Color: Yellow / Appearance: Clear / SG: >1.050 / pH: x  Gluc: x / Ketone: Negative  / Bili: Negative / Urobili: Negative   Blood: x / Protein: 30 mg/dL / Nitrite: Negative   Leuk Esterase: Negative / RBC: 2 /hpf / WBC 1 /HPF   Sq Epi: x / Non Sq Epi: 1 /hpf / Bacteria: Negative    MICROBIOLOGY:    12/1 COVID+    RADIOLOGY:    12/1 CT:    IMPRESSION:    Early acute appendicitis. No free air, focal collection, or bowel obstruction.  Findings discussed with Dr. Tinsley at 12:30 am on 12/1/2020 with RBV.

## 2020-12-01 NOTE — H&P ADULT - HISTORY OF PRESENT ILLNESS
25M hx asthma, gastritis, COVID19 (11/8/20) presenting with 3 days of abdominal pain. Patient reports new onset RLQ pain, worse with ambulation and movement, coming and going. Eating regularly. Denies fever/chills, nausea/vomiting. Patient reports having undergone 4 COVID tests this week (requires negative x2 to return to work), one indeterminant, one negative, one positive, and one pending results. Denies prior abdominal surgeries.

## 2020-12-01 NOTE — H&P ADULT - NSHPLABSRESULTS_GEN_ALL_CORE
14.2   8.96  )-----------( 228      ( 30 Nov 2020 23:11 )             42.9     11-30    140  |  104  |  19  ----------------------------<  87  3.6   |  25  |  0.98    Ca    9.8      30 Nov 2020 23:11    TPro  7.4  /  Alb  4.5  /  TBili  0.3  /  DBili  x   /  AST  22  /  ALT  61<H>  /  AlkPhos  99  11-30      < from: CT Abdomen and Pelvis w/ IV Cont (11.30.20 @ 23:43) >    EXAM:  CT ABDOMEN AND PELVIS IC                            PROCEDURE DATE:  11/30/2020            INTERPRETATION:  Abdominal/Pelvic CT    12/1/2020 12:24 AM    Indication: Right lower quadrant pain    Technique: Axial images were obtained followingIV contrast from the lung bases through pubic symphysis.  90 cc of Omnipaque 350 was administered intravenously without complication and 10 cc was discarded.  Reformatted coronal and sagittal images are submitted.    Comparison: None    FINDINGS:    LUNG BASES:  There are no pleural effusions. 2 mm pleural-based lymph node in the lateral right middle lobe probably represents an intrapulmonary lymph node.  PERITONEUM:  There is no free air or focal collection.  No free fluid.  LIVER: Normal.  SPLEEN: Normal.  GALLBLADDER: Contracted.  BILIARY TREE: Unremarkable.  PANCREAS: Normal.  ADRENAL GLANDS: Normal.  KIDNEYS: Normal.  BOWEL: The stomach is incompletely distended.  There is no small bowel obstruction, focal bowel wall thickening or diverticulitis. The appendix is retrocecal. It is hyperemic and thick-walled, measuring 6 mm with mild surrounding fat stranding. There is no significant fecal load.    URINARY BLADDER: Incompletely distended.  PELVIC ORGANS: The prostate is not enlarged.    There is no significant adenopathy.  VASCULATURE: Unremarkable.  RETROPERITONEUM:  There is no mass.  BONES: Unremarkable.  ABDOMINAL WALL: Unremarkable.    IMPRESSION:    Early acute appendicitis. No free air, focal collection, or bowel obstruction.  Findings discussed with Dr. Tinsley at 12:30 am on 12/1/2020 with RBV.    HORTENCIA KNOX MD; Attending Radiologist  This document has been electronically signed. Dec  1 2020 12:33AM    < end of copied text >

## 2020-12-01 NOTE — H&P ADULT - ATTENDING COMMENTS
I saw and examined the pt and discussed the tx plan with the House Staff. I agree with the exam and plan as documented in the above H&P.  Report no pain currently. ad 3 days of symptoms.  Abdomen soft, with mild RLQ/R lateral tenderness to palpation.  Covid + last night. Had been tested + first on 11/8.   CT no appendicolith.  Given overall presentation, a trial of nonop management with ABX seems the best approach for him. He seems to be responding appropriately.  NPO for now, clears for dinner if no issues.   Eleanor Valencia MD

## 2020-12-01 NOTE — H&P ADULT - NSHPPHYSICALEXAM_GEN_ALL_CORE
NAD, resting in stretcher  Alert, responding appropriately  Non labored respirations  Soft, mild RLQ tenderness, ND, no rebound/guarding  WWP

## 2020-12-01 NOTE — PATIENT PROFILE ADULT - NSPROEXTENSIONSOFSELF_GEN_A_NUR
[History reviewed] : History reviewed. [Medications and Allergies reviewed] : Medications and allergies reviewed. none

## 2020-12-02 ENCOUNTER — TRANSCRIPTION ENCOUNTER (OUTPATIENT)
Age: 25
End: 2020-12-02

## 2020-12-02 LAB
ANION GAP SERPL CALC-SCNC: 13 MMOL/L — SIGNIFICANT CHANGE UP (ref 5–17)
BUN SERPL-MCNC: 9 MG/DL — SIGNIFICANT CHANGE UP (ref 7–23)
CALCIUM SERPL-MCNC: 9.6 MG/DL — SIGNIFICANT CHANGE UP (ref 8.4–10.5)
CHLORIDE SERPL-SCNC: 105 MMOL/L — SIGNIFICANT CHANGE UP (ref 96–108)
CO2 SERPL-SCNC: 24 MMOL/L — SIGNIFICANT CHANGE UP (ref 22–31)
CREAT SERPL-MCNC: 0.94 MG/DL — SIGNIFICANT CHANGE UP (ref 0.5–1.3)
GLUCOSE SERPL-MCNC: 79 MG/DL — SIGNIFICANT CHANGE UP (ref 70–99)
HCT VFR BLD CALC: 39.5 % — SIGNIFICANT CHANGE UP (ref 39–50)
HGB BLD-MCNC: 13.3 G/DL — SIGNIFICANT CHANGE UP (ref 13–17)
MAGNESIUM SERPL-MCNC: 2 MG/DL — SIGNIFICANT CHANGE UP (ref 1.6–2.6)
MCHC RBC-ENTMCNC: 30 PG — SIGNIFICANT CHANGE UP (ref 27–34)
MCHC RBC-ENTMCNC: 33.7 GM/DL — SIGNIFICANT CHANGE UP (ref 32–36)
MCV RBC AUTO: 89 FL — SIGNIFICANT CHANGE UP (ref 80–100)
NRBC # BLD: 0 /100 WBCS — SIGNIFICANT CHANGE UP (ref 0–0)
PHOSPHATE SERPL-MCNC: 3.9 MG/DL — SIGNIFICANT CHANGE UP (ref 2.5–4.5)
PLATELET # BLD AUTO: 209 K/UL — SIGNIFICANT CHANGE UP (ref 150–400)
POTASSIUM SERPL-MCNC: 3.7 MMOL/L — SIGNIFICANT CHANGE UP (ref 3.5–5.3)
POTASSIUM SERPL-SCNC: 3.7 MMOL/L — SIGNIFICANT CHANGE UP (ref 3.5–5.3)
RBC # BLD: 4.44 M/UL — SIGNIFICANT CHANGE UP (ref 4.2–5.8)
RBC # FLD: 11.9 % — SIGNIFICANT CHANGE UP (ref 10.3–14.5)
SARS-COV-2 IGG SERPL QL IA: POSITIVE
SARS-COV-2 IGM SERPL IA-ACNC: 9.11 INDEX — HIGH
SODIUM SERPL-SCNC: 142 MMOL/L — SIGNIFICANT CHANGE UP (ref 135–145)
WBC # BLD: 7.81 K/UL — SIGNIFICANT CHANGE UP (ref 3.8–10.5)
WBC # FLD AUTO: 7.81 K/UL — SIGNIFICANT CHANGE UP (ref 3.8–10.5)

## 2020-12-02 PROCEDURE — 99232 SBSQ HOSP IP/OBS MODERATE 35: CPT

## 2020-12-02 RX ORDER — PIPERACILLIN AND TAZOBACTAM 4; .5 G/20ML; G/20ML
3.38 INJECTION, POWDER, LYOPHILIZED, FOR SOLUTION INTRAVENOUS ONCE
Refills: 0 | Status: COMPLETED | OUTPATIENT
Start: 2020-12-02 | End: 2020-12-02

## 2020-12-02 RX ORDER — PIPERACILLIN AND TAZOBACTAM 4; .5 G/20ML; G/20ML
3.38 INJECTION, POWDER, LYOPHILIZED, FOR SOLUTION INTRAVENOUS EVERY 8 HOURS
Refills: 0 | Status: DISCONTINUED | OUTPATIENT
Start: 2020-12-02 | End: 2020-12-03

## 2020-12-02 RX ADMIN — PIPERACILLIN AND TAZOBACTAM 25 GRAM(S): 4; .5 INJECTION, POWDER, LYOPHILIZED, FOR SOLUTION INTRAVENOUS at 21:13

## 2020-12-02 RX ADMIN — ENOXAPARIN SODIUM 40 MILLIGRAM(S): 100 INJECTION SUBCUTANEOUS at 09:55

## 2020-12-02 RX ADMIN — AMPICILLIN SODIUM AND SULBACTAM SODIUM 200 GRAM(S): 250; 125 INJECTION, POWDER, FOR SUSPENSION INTRAMUSCULAR; INTRAVENOUS at 05:21

## 2020-12-02 RX ADMIN — AMPICILLIN SODIUM AND SULBACTAM SODIUM 200 GRAM(S): 250; 125 INJECTION, POWDER, FOR SUSPENSION INTRAMUSCULAR; INTRAVENOUS at 09:55

## 2020-12-02 RX ADMIN — PIPERACILLIN AND TAZOBACTAM 200 GRAM(S): 4; .5 INJECTION, POWDER, LYOPHILIZED, FOR SOLUTION INTRAVENOUS at 13:58

## 2020-12-02 NOTE — DISCHARGE NOTE PROVIDER - HOSPITAL COURSE
25M hx asthma, gastritis, COVID19 (11/8/20) presenting with 3 days of abdominal pain. Patient reports new onset RLQ pain, worse with ambulation and movement, coming and going.   He had a Covid + night of admission. Had been tested + first on 11/8. His CT showed no appendicolith. Given overall presentation, a trial of nonop management with ABX seems the best approach for him. He seems to be responding appropriately. He was NPO and given clears for dinner. He tolerated clears and was advanced to regular on 12/2. ID was consulted regarding covid status and did not feel the patient needed isolation. Antibiotics were changed from Zosyn to Unasyn per ID, which made patient bloated so he was changed back to Zosyn.  He will complete a course of Augmentin as outpatient.  On day of discharge, the patient was tolerating diet, ambulating well and pain controlled. He will follow  up with Dr. Pierre in 1 week.

## 2020-12-02 NOTE — DISCHARGE NOTE PROVIDER - NSDCMRMEDTOKEN_GEN_ALL_CORE_FT
acetaminophen 325 mg oral tablet: 3 tab(s) orally every 6 hours, As needed, Mild Pain (1 - 3)  Augmentin 875 mg-125 mg oral tablet: 1 tab(s) orally every 12 hours x 7 days

## 2020-12-02 NOTE — PROGRESS NOTE ADULT - ATTENDING COMMENTS
I saw and examined the pt and discussed the tx plan with the House Staff. I agree with the exam and plan as documented in the surgery resident's note from today with comments/changes below.  Pain is less, minimal. Tolerating solids. Feels bloated with every dose of Unasyn. Has flatus and BMs.   Abdomen soft, with min R lateral tenderness, ND.  Stable.  Would switch back to Zosyn.  Plan for am d/c if continues to improve.   Eleanor Valencia MD

## 2020-12-02 NOTE — PROGRESS NOTE ADULT - SUBJECTIVE AND OBJECTIVE BOX
CC: F/U for appendicitis    Saw/spoke to patient. No fevers, no chills. No new complaints.    Allergies  magnesium sulfate (Other)    ANTIMICROBIALS:  ampicillin/sulbactam  IVPB 3 every 6 hours    PE:    Vital Signs Last 24 Hrs  T(C): 36.5 (02 Dec 2020 08:47), Max: 36.8 (01 Dec 2020 14:42)  T(F): 97.7 (02 Dec 2020 08:47), Max: 98.3 (01 Dec 2020 14:42)  HR: 70 (02 Dec 2020 08:47) (65 - 77)  BP: 113/71 (02 Dec 2020 08:47) (113/71 - 133/79)  RR: 18 (02 Dec 2020 08:47) (17 - 18)  SpO2: 97% (02 Dec 2020 08:47) (96% - 98%)    Gen: AOx3, NAD, non-toxic  CV: S1+S2 normal, nontachycardic  Resp: Clear bilat, no resp distress, no crackles/wheezes  Abd: Soft, nontender, +BS  Ext: No LE edema, no wounds    LABS:                        13.3   7.81  )-----------( 209      ( 02 Dec 2020 08:55 )             39.5     12-02    142  |  105  |  9   ----------------------------<  79  3.7   |  24  |  0.94    Ca    9.6      02 Dec 2020 08:55  Phos  3.9     12-02  Mg     2.0     12-02    TPro  7.4  /  Alb  4.5  /  TBili  0.3  /  DBili  x   /  AST  22  /  ALT  61<H>  /  AlkPhos  99  11-30    Urinalysis Basic - ( 01 Dec 2020 00:32 )    Color: Yellow / Appearance: Clear / SG: >1.050 / pH: x  Gluc: x / Ketone: Negative  / Bili: Negative / Urobili: Negative   Blood: x / Protein: 30 mg/dL / Nitrite: Negative   Leuk Esterase: Negative / RBC: 2 /hpf / WBC 1 /HPF   Sq Epi: x / Non Sq Epi: 1 /hpf / Bacteria: Negative    MICROBIOLOGY:    .Urine Clean Catch (Midstream)  12-01-20   <10,000 CFU/mL Normal Urogenital Zara    (otherwise reviewed)    RADIOLOGY:    11/30 CT:    IMPRESSION:    Early acute appendicitis. No free air, focal collection, or bowel obstruction.  Findings discussed with Dr. Tinsley at 12:30 am on 12/1/2020 with RBV.

## 2020-12-02 NOTE — PROGRESS NOTE ADULT - SUBJECTIVE AND OBJECTIVE BOX
Interval events: Advanced to CLD    SUBJECTIVE:  Reports pain well controlled. Tolerating clear diet. Denies N/V. Bowel Function +/+  Ambulating independently       OBJECTIVE:  Vital Signs Last 24 Hrs  T(C): 36.8 (01 Dec 2020 21:08), Max: 36.8 (01 Dec 2020 05:30)  T(F): 98.2 (01 Dec 2020 21:08), Max: 98.3 (01 Dec 2020 10:44)  HR: 74 (01 Dec 2020 21:08) (65 - 87)  BP: 120/75 (01 Dec 2020 21:08) (103/72 - 133/79)  BP(mean): --  RR: 18 (01 Dec 2020 21:08) (18 - 18)  SpO2: 97% (01 Dec 2020 21:08) (96% - 98%)    Physical Examination:  GEN: NAD, resting quietly  PULM: symmetric chest rise bilaterally, no increased WOB  ABD: soft, nontender, nondistended  EXTR: no LE erythema, moving all extremities      LABS:                        14.2   8.96  )-----------( 228      ( 30 Nov 2020 23:11 )             42.9       11-30    140  |  104  |  19  ----------------------------<  87  3.6   |  25  |  0.98    Ca    9.8      30 Nov 2020 23:11    TPro  7.4  /  Alb  4.5  /  TBili  0.3  /  DBili  x   /  AST  22  /  ALT  61<H>  /  AlkPhos  99  11-30

## 2020-12-02 NOTE — PROGRESS NOTE ADULT - ASSESSMENT
25 M hx asthma, gastritis, COVID19 (11/8/20) presenting with 3 days of abdominal pain  No fevers, no leukocytosis  COVID+ on 11/8, then positive here  Patient here found on CT to have appendicitis  Planned for conservative management  Overall,  1) Appendicitis  - Not planned for surgery presently  - Unasyn 3g q 6  - If DC planning, can send out on Augmentin 875mg po q 12 to complete 10 day course (including IV days)  - F/U surgery team regarding OR planning in the future  - Monitor for further signs/symptoms worsening appy  2) COVID  - Patient with history positive COVID on 11/8, never had symptoms (had asymptomatic checks for work)  - Monitor for any further signs  - From ID perspective can DC isolation as long as infection control in agreement (asymptomatic x >21 days)    Huy Cintron MD  Pager [phone]  After 5pm and on weekends call [phone]

## 2020-12-02 NOTE — DISCHARGE NOTE PROVIDER - CARE PROVIDER_API CALL
Eleanor Valencia  COLON/RECTAL SURGERY  [address] [place] [address]  Phone: [phone]  Fax: [phone]  Follow Up Time:

## 2020-12-02 NOTE — PROGRESS NOTE ADULT - ASSESSMENT
25M hx asthma, gastritis, COVID19 (11/8/20) presenting with 3 days of abdominal pain found to have acute appendicitis    - CLD/ IV hydration   - Continue IV abx   - Favor medical management at this time in the setting of recent COVID-19 infection  - Pain control as needed  - VTE ppx: Lovenox, ambulation      General Surgery (Green)  [id number] with questions

## 2020-12-02 NOTE — DISCHARGE NOTE PROVIDER - NSDCCPCAREPLAN_GEN_ALL_CORE_FT
PRINCIPAL DISCHARGE DIAGNOSIS  Diagnosis: Appendicitis  Assessment and Plan of Treatment: NOTIFY YOUR SURGEON IF:  Any fever (over 100.4 F) or chills, persistent nausea/vomiting with inability to tolerate food or liquids, persistent diarrhea, or if your abdominal pain returns or worsens.   FOLLOW-UP:  1. Please call to make a follow-up appointment in 1-2 weeks upon discharge from the hospital with Dr. Pierre  2. Please follow up with your primary care physician in one week regarding your hospitalization.

## 2020-12-03 ENCOUNTER — TRANSCRIPTION ENCOUNTER (OUTPATIENT)
Age: 25
End: 2020-12-03

## 2020-12-03 VITALS
DIASTOLIC BLOOD PRESSURE: 69 MMHG | TEMPERATURE: 98 F | OXYGEN SATURATION: 98 % | RESPIRATION RATE: 18 BRPM | HEART RATE: 69 BPM | SYSTOLIC BLOOD PRESSURE: 110 MMHG

## 2020-12-03 LAB
ANION GAP SERPL CALC-SCNC: 13 MMOL/L — SIGNIFICANT CHANGE UP (ref 5–17)
BUN SERPL-MCNC: 11 MG/DL — SIGNIFICANT CHANGE UP (ref 7–23)
CALCIUM SERPL-MCNC: 9.8 MG/DL — SIGNIFICANT CHANGE UP (ref 8.4–10.5)
CHLORIDE SERPL-SCNC: 103 MMOL/L — SIGNIFICANT CHANGE UP (ref 96–108)
CO2 SERPL-SCNC: 25 MMOL/L — SIGNIFICANT CHANGE UP (ref 22–31)
CREAT SERPL-MCNC: 1.06 MG/DL — SIGNIFICANT CHANGE UP (ref 0.5–1.3)
GLUCOSE SERPL-MCNC: 79 MG/DL — SIGNIFICANT CHANGE UP (ref 70–99)
HCT VFR BLD CALC: 41.9 % — SIGNIFICANT CHANGE UP (ref 39–50)
HGB BLD-MCNC: 14.3 G/DL — SIGNIFICANT CHANGE UP (ref 13–17)
MAGNESIUM SERPL-MCNC: 2.1 MG/DL — SIGNIFICANT CHANGE UP (ref 1.6–2.6)
MCHC RBC-ENTMCNC: 30 PG — SIGNIFICANT CHANGE UP (ref 27–34)
MCHC RBC-ENTMCNC: 34.1 GM/DL — SIGNIFICANT CHANGE UP (ref 32–36)
MCV RBC AUTO: 87.8 FL — SIGNIFICANT CHANGE UP (ref 80–100)
NRBC # BLD: 0 /100 WBCS — SIGNIFICANT CHANGE UP (ref 0–0)
PHOSPHATE SERPL-MCNC: 4.7 MG/DL — HIGH (ref 2.5–4.5)
PLATELET # BLD AUTO: 229 K/UL — SIGNIFICANT CHANGE UP (ref 150–400)
POTASSIUM SERPL-MCNC: 3.8 MMOL/L — SIGNIFICANT CHANGE UP (ref 3.5–5.3)
POTASSIUM SERPL-SCNC: 3.8 MMOL/L — SIGNIFICANT CHANGE UP (ref 3.5–5.3)
RBC # BLD: 4.77 M/UL — SIGNIFICANT CHANGE UP (ref 4.2–5.8)
RBC # FLD: 11.9 % — SIGNIFICANT CHANGE UP (ref 10.3–14.5)
SODIUM SERPL-SCNC: 141 MMOL/L — SIGNIFICANT CHANGE UP (ref 135–145)
WBC # BLD: 7.91 K/UL — SIGNIFICANT CHANGE UP (ref 3.8–10.5)
WBC # FLD AUTO: 7.91 K/UL — SIGNIFICANT CHANGE UP (ref 3.8–10.5)

## 2020-12-03 PROCEDURE — 86769 SARS-COV-2 COVID-19 ANTIBODY: CPT

## 2020-12-03 PROCEDURE — 84100 ASSAY OF PHOSPHORUS: CPT

## 2020-12-03 PROCEDURE — 83735 ASSAY OF MAGNESIUM: CPT

## 2020-12-03 PROCEDURE — 85027 COMPLETE CBC AUTOMATED: CPT

## 2020-12-03 PROCEDURE — 96361 HYDRATE IV INFUSION ADD-ON: CPT | Mod: XU

## 2020-12-03 PROCEDURE — 87086 URINE CULTURE/COLONY COUNT: CPT

## 2020-12-03 PROCEDURE — 99285 EMERGENCY DEPT VISIT HI MDM: CPT | Mod: 25

## 2020-12-03 PROCEDURE — 96374 THER/PROPH/DIAG INJ IV PUSH: CPT | Mod: XU

## 2020-12-03 PROCEDURE — 80048 BASIC METABOLIC PNL TOTAL CA: CPT

## 2020-12-03 PROCEDURE — 99232 SBSQ HOSP IP/OBS MODERATE 35: CPT

## 2020-12-03 PROCEDURE — 96375 TX/PRO/DX INJ NEW DRUG ADDON: CPT | Mod: XU

## 2020-12-03 PROCEDURE — 83690 ASSAY OF LIPASE: CPT

## 2020-12-03 PROCEDURE — 80053 COMPREHEN METABOLIC PANEL: CPT

## 2020-12-03 PROCEDURE — U0003: CPT

## 2020-12-03 PROCEDURE — 74177 CT ABD & PELVIS W/CONTRAST: CPT

## 2020-12-03 PROCEDURE — 85025 COMPLETE CBC W/AUTO DIFF WBC: CPT

## 2020-12-03 PROCEDURE — 81001 URINALYSIS AUTO W/SCOPE: CPT

## 2020-12-03 RX ORDER — ACETAMINOPHEN 500 MG
3 TABLET ORAL
Qty: 0 | Refills: 0 | DISCHARGE
Start: 2020-12-03

## 2020-12-03 RX ADMIN — PIPERACILLIN AND TAZOBACTAM 25 GRAM(S): 4; .5 INJECTION, POWDER, LYOPHILIZED, FOR SOLUTION INTRAVENOUS at 05:22

## 2020-12-03 RX ADMIN — ENOXAPARIN SODIUM 40 MILLIGRAM(S): 100 INJECTION SUBCUTANEOUS at 08:49

## 2020-12-03 NOTE — PROGRESS NOTE ADULT - ATTENDING COMMENTS
I saw and examined the pt and discussed the tx plan with the House Staff. I agree with the exam and plan as documented in the surgery resident's note from today with comments/changes below.  Feels well.  Abdomen benign.  Home with Augmentin x 7 days. F/u with me in 2 weeks. Knows to call my office with any recurrent symptoms.   Eleanor Valencia MD

## 2020-12-03 NOTE — PROGRESS NOTE ADULT - SUBJECTIVE AND OBJECTIVE BOX
Interval events: no acute events    SUBJECTIVE:  Reports pain well controlled. Tolerating regular diet. Denies N/V. Bowel Function +/+  Ambulating independently       OBJECTIVE:  Vital Signs Last 24 Hrs  T(C): 36.8 (03 Dec 2020 01:12), Max: 36.9 (02 Dec 2020 22:15)  T(F): 98.2 (03 Dec 2020 01:12), Max: 98.4 (02 Dec 2020 22:15)  HR: 72 (03 Dec 2020 01:12) (62 - 77)  BP: 111/73 (03 Dec 2020 01:12) (107/62 - 120/73)  BP(mean): --  RR: 18 (03 Dec 2020 01:12) (17 - 18)  SpO2: 98% (03 Dec 2020 01:12) (97% - 98%)    Physical Examination:  GEN: NAD, resting quietly  PULM: symmetric chest rise bilaterally, no increased WOB  ABD: soft, nontender, nondistended  EXTR: no LE erythema, moving all extremities      LABS:                        13.3   7.81  )-----------( 209      ( 02 Dec 2020 08:55 )             39.5       12-02    142  |  105  |  9   ----------------------------<  79  3.7   |  24  |  0.94    Ca    9.6      02 Dec 2020 08:55  Phos  3.9     12-02  Mg     2.0     12-02

## 2020-12-03 NOTE — PROGRESS NOTE ADULT - ASSESSMENT
25M hx asthma, gastritis, COVID19 (11/8/20) presenting with 3 days of abdominal pain found to have acute appendicitis    - Regular diet   - Continue IV abx   - Pain control as needed  - VTE ppx: Lovenox, ambulation  - Discharge planning       General Surgery (Green)  [id number] with questions

## 2020-12-03 NOTE — DISCHARGE NOTE NURSING/CASE MANAGEMENT/SOCIAL WORK - PATIENT PORTAL LINK FT
You can access the FollowMyHealth Patient Portal offered by [facility] by registering at the following website: [email or web address]. By joining [facility]’s FollowMyHealth portal, you will also be able to view your health information using other applications (apps) compatible with our system.

## 2020-12-03 NOTE — PROGRESS NOTE ADULT - ASSESSMENT
25 M hx asthma, gastritis, COVID19 (11/8/20) presenting with 3 days of abdominal pain  No fevers, no leukocytosis  COVID+ on 11/8, then positive here  Patient here found on CT to have appendicitis  Planned for conservative management  Overall,  1) Appendicitis  - Not planned for surgery presently  - Note in the interim, primary team changed patient back to Zosyn  - If DC planning, can send out on Augmentin 875mg po q 12 to complete 10 day course (including IV days)  - F/U surgery team regarding OR planning in the future  - Monitor for further signs/symptoms worsening appy  2) COVID  - Patient with history positive COVID on 11/8, never had symptoms (had asymptomatic checks for work)  - Monitor for any further signs  - From ID perspective can DC isolation as long as infection control in agreement (asymptomatic x >21 days)    Huy Cintron MD  Pager [phone]  After 5pm and on weekends call [phone]

## 2020-12-03 NOTE — PROGRESS NOTE ADULT - SUBJECTIVE AND OBJECTIVE BOX
CC: F/U for COVID    Saw/spoke to patient. No fevers, no chills. No new complaints.    Allergies  magnesium sulfate (Other)    ANTIMICROBIALS:  piperacillin/tazobactam IVPB.. 3.375 every 8 hours    PE:    Vital Signs Last 24 Hrs  T(C): 36.7 (03 Dec 2020 08:42), Max: 37.1 (03 Dec 2020 05:26)  T(F): 98 (03 Dec 2020 08:42), Max: 98.7 (03 Dec 2020 05:26)  HR: 69 (03 Dec 2020 08:42) (62 - 73)  BP: 110/69 (03 Dec 2020 08:42) (107/62 - 120/73)  RR: 18 (03 Dec 2020 08:42) (18 - 18)  SpO2: 98% (03 Dec 2020 08:42) (97% - 99%)    Gen: AOx3, NAD, non-toxic  CV: S1+S2 normal, nontachycardic  Resp: Clear bilat, no resp distress, no crackles/wheezes  Abd: Soft, nontender, +BS  Ext: No LE edema, no wounds    LABS:                        14.3   7.91  )-----------( 229      ( 03 Dec 2020 06:14 )             41.9     12-03    141  |  103  |  11  ----------------------------<  79  3.8   |  25  |  1.06    Ca    9.8      03 Dec 2020 06:14  Phos  4.7     12-03  Mg     2.1     12-03    MICROBIOLOGY:    .Urine Clean Catch (Midstream)  12-01-20   <10,000 CFU/mL Normal Urogenital Zara    RADIOLOGY:    11/30 CT:    IMPRESSION:    Early acute appendicitis. No free air, focal collection, or bowel obstruction.  Findings discussed with Dr. Tinsley at 12:30 am on 12/1/2020 with RBV.

## 2020-12-04 ENCOUNTER — TRANSCRIPTION ENCOUNTER (OUTPATIENT)
Age: 25
End: 2020-12-04

## 2020-12-07 ENCOUNTER — TRANSCRIPTION ENCOUNTER (OUTPATIENT)
Age: 25
End: 2020-12-07

## 2020-12-28 ENCOUNTER — APPOINTMENT (OUTPATIENT)
Dept: SURGERY | Facility: CLINIC | Age: 25
End: 2020-12-28
Payer: COMMERCIAL

## 2020-12-28 VITALS
SYSTOLIC BLOOD PRESSURE: 145 MMHG | DIASTOLIC BLOOD PRESSURE: 89 MMHG | BODY MASS INDEX: 31.84 KG/M2 | RESPIRATION RATE: 15 BRPM | HEART RATE: 77 BPM | TEMPERATURE: 98 F | WEIGHT: 215 LBS | HEIGHT: 69 IN | OXYGEN SATURATION: 98 %

## 2020-12-28 DIAGNOSIS — J45.909 UNSPECIFIED ASTHMA, UNCOMPLICATED: ICD-10-CM

## 2020-12-28 DIAGNOSIS — K35.80 UNSPECIFIED ACUTE APPENDICITIS: ICD-10-CM

## 2020-12-28 DIAGNOSIS — Z83.79 FAMILY HISTORY OF OTHER DISEASES OF THE DIGESTIVE SYSTEM: ICD-10-CM

## 2020-12-28 DIAGNOSIS — U07.1 COVID-19: ICD-10-CM

## 2020-12-28 PROCEDURE — 99213 OFFICE O/P EST LOW 20 MIN: CPT

## 2020-12-28 PROCEDURE — 99072 ADDL SUPL MATRL&STAF TM PHE: CPT

## 2020-12-28 RX ORDER — ALBUTEROL 90 MCG
AEROSOL (GRAM) INHALATION
Refills: 0 | Status: ACTIVE | COMMUNITY

## 2020-12-31 PROBLEM — K35.80 APPENDICITIS, ACUTE: Status: RESOLVED | Noted: 2020-12-28 | Resolved: 2020-12-31

## 2020-12-31 NOTE — ASSESSMENT
[FreeTextEntry1] : 24 yo male with a recent relatively minor episode of appendicitis that resolved with ABX. He had been Covid + at the time of presentation. \par He is doing well currently. \par We discussed if there is any merit in performing an elective laparoscopic appendectomy for him. Given he has no appendicolith on CT, and the natural history of appendicitis treated with ABX, his risk of having a subsequent episode is likely that of the general population. I would not remove his appendix "just" for the very low possibility of harboring a growth. \par It is reasonable not to plan for elective lap appy - if the pt develops another episode, he would seek medical attention promptly and have surgery at that time as indicated. We discussed again symptoms of appendicitis and knows to seek prompt medical attention. The pt understands and agrees with this plan.\par RTO as needed.

## 2020-12-31 NOTE — HISTORY OF PRESENT ILLNESS
[FreeTextEntry1] : 25M hx asthma, gastritis, COVID19 (11/8/20) presented to [facility] ED with 3 days of abdominal \par pain (RLQ pain, worse with ambulation and movement, pain was mild). He had a Covid + night of admission. Had been tested + first on 11/8. The pt had a benign exam and was not toxic. His CT showed a retrocecal appendix and no appendicolith. He was treated with IV ABX with quick resolution of his pain. He was discharged home on Augmentin which he completed. \par Today, patient reports feeling well. Denies abdominal pain. States he has had no pain since discharge.

## 2020-12-31 NOTE — PHYSICAL EXAM
[FreeTextEntry1] : This is a 25 -year-old well-developed male in no apparent distress.\par \par Abdomen soft, nontender, nondistended, no masses. \par \par Psychiatric-oriented to time place and person. Good understanding of conversation.\par \par \par

## 2021-01-25 ENCOUNTER — APPOINTMENT (OUTPATIENT)
Dept: SURGERY | Facility: CLINIC | Age: 26
End: 2021-01-25
Payer: COMMERCIAL

## 2021-01-25 VITALS
SYSTOLIC BLOOD PRESSURE: 136 MMHG | TEMPERATURE: 98.2 F | HEIGHT: 69 IN | DIASTOLIC BLOOD PRESSURE: 83 MMHG | HEART RATE: 100 BPM | BODY MASS INDEX: 33.33 KG/M2 | WEIGHT: 225 LBS | RESPIRATION RATE: 18 BRPM | OXYGEN SATURATION: 99 %

## 2021-01-25 DIAGNOSIS — R10.9 UNSPECIFIED ABDOMINAL PAIN: ICD-10-CM

## 2021-01-25 PROCEDURE — 99072 ADDL SUPL MATRL&STAF TM PHE: CPT

## 2021-01-25 PROCEDURE — 99214 OFFICE O/P EST MOD 30 MIN: CPT

## 2021-01-25 RX ORDER — AMOXICILLIN AND CLAVULANATE POTASSIUM 875; 125 MG/1; MG/1
875-125 TABLET, COATED ORAL TWICE DAILY
Qty: 14 | Refills: 0 | Status: ACTIVE | COMMUNITY
Start: 2021-01-25 | End: 1900-01-01

## 2021-01-26 ENCOUNTER — OUTPATIENT (OUTPATIENT)
Dept: OUTPATIENT SERVICES | Facility: HOSPITAL | Age: 26
LOS: 1 days | End: 2021-01-26
Payer: COMMERCIAL

## 2021-01-26 ENCOUNTER — APPOINTMENT (OUTPATIENT)
Dept: CT IMAGING | Facility: CLINIC | Age: 26
End: 2021-01-26
Payer: COMMERCIAL

## 2021-01-26 ENCOUNTER — NON-APPOINTMENT (OUTPATIENT)
Age: 26
End: 2021-01-26

## 2021-01-26 DIAGNOSIS — R10.31 RIGHT LOWER QUADRANT PAIN: ICD-10-CM

## 2021-01-26 DIAGNOSIS — Z00.8 ENCOUNTER FOR OTHER GENERAL EXAMINATION: ICD-10-CM

## 2021-01-26 PROCEDURE — 74177 CT ABD & PELVIS W/CONTRAST: CPT | Mod: 26

## 2021-01-26 PROCEDURE — 74177 CT ABD & PELVIS W/CONTRAST: CPT

## 2021-02-03 PROBLEM — R10.9 RIGHT SIDED ABDOMINAL PAIN: Status: ACTIVE | Noted: 2021-02-03

## 2021-02-03 NOTE — ASSESSMENT
[FreeTextEntry1] : 25-year-old male with right-sided abdominal pain and retrocecal appendix, history of acute appendicitis in December 2020.  Overall benign exam.  It is possible he has a recurrence of appendicitis.  Alternatively his pain may be musculoskeletal.  A CT of the abdomen pelvis has been ordered.  We will start the patient empirically on antibiotics while we are waiting for the results of the CT.

## 2021-02-03 NOTE — HISTORY OF PRESENT ILLNESS
[de-identified] : Stephen is a 25 year old male here with right-sided abdominal pain since this morning.  He also reports discomfort along the right lower rib cage. Denies fever, chills,diarrhea,  nausea or vomiting. \par He had an episode of acute appendicitis in December while he was Covid positive.  His appendix was retrocecal and had no appendicolith.  Minor symptoms.  He was successfully treated with antibiotics with quick resolution of his symptoms and completed a course of Augmentin at home.  Las seen on 12/28/20, doing well. \par

## 2021-09-21 ENCOUNTER — EMERGENCY (EMERGENCY)
Facility: HOSPITAL | Age: 26
LOS: 1 days | Discharge: HOME | End: 2021-09-21
Attending: EMERGENCY MEDICINE
Payer: COMMERCIAL

## 2021-09-21 VITALS
TEMPERATURE: 98 F | SYSTOLIC BLOOD PRESSURE: 124 MMHG | OXYGEN SATURATION: 100 % | HEART RATE: 61 BPM | RESPIRATION RATE: 18 BRPM | DIASTOLIC BLOOD PRESSURE: 70 MMHG

## 2021-09-21 VITALS
DIASTOLIC BLOOD PRESSURE: 76 MMHG | WEIGHT: 229.94 LBS | TEMPERATURE: 98 F | SYSTOLIC BLOOD PRESSURE: 121 MMHG | HEIGHT: 69 IN | OXYGEN SATURATION: 99 % | RESPIRATION RATE: 20 BRPM | HEART RATE: 60 BPM

## 2021-09-21 LAB
ALBUMIN SERPL ELPH-MCNC: 4.8 G/DL — SIGNIFICANT CHANGE UP (ref 3.3–5)
ALP SERPL-CCNC: 95 U/L — SIGNIFICANT CHANGE UP (ref 40–120)
ALT FLD-CCNC: 40 U/L — SIGNIFICANT CHANGE UP (ref 10–45)
ANION GAP SERPL CALC-SCNC: 13 MMOL/L — SIGNIFICANT CHANGE UP (ref 5–17)
APPEARANCE UR: CLEAR — SIGNIFICANT CHANGE UP
APTT BLD: 30.7 SEC — SIGNIFICANT CHANGE UP (ref 27.5–35.5)
AST SERPL-CCNC: 21 U/L — SIGNIFICANT CHANGE UP (ref 10–40)
BACTERIA # UR AUTO: NEGATIVE — SIGNIFICANT CHANGE UP
BASE EXCESS BLDV CALC-SCNC: 0.7 MMOL/L — SIGNIFICANT CHANGE UP (ref -2–2)
BASOPHILS # BLD AUTO: 0.03 K/UL — SIGNIFICANT CHANGE UP (ref 0–0.2)
BASOPHILS NFR BLD AUTO: 0.4 % — SIGNIFICANT CHANGE UP (ref 0–2)
BILIRUB SERPL-MCNC: 0.3 MG/DL — SIGNIFICANT CHANGE UP (ref 0.2–1.2)
BILIRUB UR-MCNC: NEGATIVE — SIGNIFICANT CHANGE UP
BLD GP AB SCN SERPL QL: NEGATIVE — SIGNIFICANT CHANGE UP
BUN SERPL-MCNC: 13 MG/DL — SIGNIFICANT CHANGE UP (ref 7–23)
CA-I SERPL-SCNC: 1.22 MMOL/L — SIGNIFICANT CHANGE UP (ref 1.15–1.33)
CALCIUM SERPL-MCNC: 9.7 MG/DL — SIGNIFICANT CHANGE UP (ref 8.4–10.5)
CHLORIDE BLDV-SCNC: 107 MMOL/L — SIGNIFICANT CHANGE UP (ref 96–108)
CHLORIDE SERPL-SCNC: 107 MMOL/L — SIGNIFICANT CHANGE UP (ref 96–108)
CO2 BLDV-SCNC: 29 MMOL/L — HIGH (ref 22–26)
CO2 SERPL-SCNC: 23 MMOL/L — SIGNIFICANT CHANGE UP (ref 22–31)
COLOR SPEC: SIGNIFICANT CHANGE UP
CREAT SERPL-MCNC: 0.92 MG/DL — SIGNIFICANT CHANGE UP (ref 0.5–1.3)
DIFF PNL FLD: NEGATIVE — SIGNIFICANT CHANGE UP
EOSINOPHIL # BLD AUTO: 0.14 K/UL — SIGNIFICANT CHANGE UP (ref 0–0.5)
EOSINOPHIL NFR BLD AUTO: 1.8 % — SIGNIFICANT CHANGE UP (ref 0–6)
EPI CELLS # UR: 0 /HPF — SIGNIFICANT CHANGE UP
GAS PNL BLDV: 140 MMOL/L — SIGNIFICANT CHANGE UP (ref 136–145)
GAS PNL BLDV: SIGNIFICANT CHANGE UP
GAS PNL BLDV: SIGNIFICANT CHANGE UP
GLUCOSE BLDV-MCNC: 94 MG/DL — SIGNIFICANT CHANGE UP (ref 70–99)
GLUCOSE SERPL-MCNC: 90 MG/DL — SIGNIFICANT CHANGE UP (ref 70–99)
GLUCOSE UR QL: NEGATIVE — SIGNIFICANT CHANGE UP
HCO3 BLDV-SCNC: 27 MMOL/L — SIGNIFICANT CHANGE UP (ref 22–29)
HCT VFR BLD CALC: 43.7 % — SIGNIFICANT CHANGE UP (ref 39–50)
HCT VFR BLDA CALC: 42 % — SIGNIFICANT CHANGE UP (ref 39–51)
HGB BLD CALC-MCNC: 14 G/DL — SIGNIFICANT CHANGE UP (ref 12.6–17.4)
HGB BLD-MCNC: 14.3 G/DL — SIGNIFICANT CHANGE UP (ref 13–17)
HYALINE CASTS # UR AUTO: 0 /LPF — SIGNIFICANT CHANGE UP (ref 0–2)
IMM GRANULOCYTES NFR BLD AUTO: 0.3 % — SIGNIFICANT CHANGE UP (ref 0–1.5)
INR BLD: 1 RATIO — SIGNIFICANT CHANGE UP (ref 0.88–1.16)
KETONES UR-MCNC: NEGATIVE — SIGNIFICANT CHANGE UP
LACTATE BLDV-MCNC: 1.4 MMOL/L — SIGNIFICANT CHANGE UP (ref 0.7–2)
LEUKOCYTE ESTERASE UR-ACNC: NEGATIVE — SIGNIFICANT CHANGE UP
LIDOCAIN IGE QN: 15 U/L — SIGNIFICANT CHANGE UP (ref 7–60)
LYMPHOCYTES # BLD AUTO: 2.16 K/UL — SIGNIFICANT CHANGE UP (ref 1–3.3)
LYMPHOCYTES # BLD AUTO: 27.4 % — SIGNIFICANT CHANGE UP (ref 13–44)
MCHC RBC-ENTMCNC: 29.7 PG — SIGNIFICANT CHANGE UP (ref 27–34)
MCHC RBC-ENTMCNC: 32.7 GM/DL — SIGNIFICANT CHANGE UP (ref 32–36)
MCV RBC AUTO: 90.7 FL — SIGNIFICANT CHANGE UP (ref 80–100)
MONOCYTES # BLD AUTO: 0.49 K/UL — SIGNIFICANT CHANGE UP (ref 0–0.9)
MONOCYTES NFR BLD AUTO: 6.2 % — SIGNIFICANT CHANGE UP (ref 2–14)
NEUTROPHILS # BLD AUTO: 5.03 K/UL — SIGNIFICANT CHANGE UP (ref 1.8–7.4)
NEUTROPHILS NFR BLD AUTO: 63.9 % — SIGNIFICANT CHANGE UP (ref 43–77)
NITRITE UR-MCNC: NEGATIVE — SIGNIFICANT CHANGE UP
NRBC # BLD: 0 /100 WBCS — SIGNIFICANT CHANGE UP (ref 0–0)
PCO2 BLDV: 49 MMHG — SIGNIFICANT CHANGE UP (ref 42–55)
PH BLDV: 7.35 — SIGNIFICANT CHANGE UP (ref 7.32–7.43)
PH UR: 7.5 — SIGNIFICANT CHANGE UP (ref 5–8)
PLATELET # BLD AUTO: 207 K/UL — SIGNIFICANT CHANGE UP (ref 150–400)
PO2 BLDV: 46 MMHG — HIGH (ref 25–45)
POTASSIUM BLDV-SCNC: 5 MMOL/L — SIGNIFICANT CHANGE UP (ref 3.5–5.1)
POTASSIUM SERPL-MCNC: 4.1 MMOL/L — SIGNIFICANT CHANGE UP (ref 3.5–5.3)
POTASSIUM SERPL-SCNC: 4.1 MMOL/L — SIGNIFICANT CHANGE UP (ref 3.5–5.3)
PROT SERPL-MCNC: 7.5 G/DL — SIGNIFICANT CHANGE UP (ref 6–8.3)
PROT UR-MCNC: NEGATIVE — SIGNIFICANT CHANGE UP
PROTHROM AB SERPL-ACNC: 12 SEC — SIGNIFICANT CHANGE UP (ref 10.6–13.6)
RBC # BLD: 4.82 M/UL — SIGNIFICANT CHANGE UP (ref 4.2–5.8)
RBC # FLD: 12.1 % — SIGNIFICANT CHANGE UP (ref 10.3–14.5)
RBC CASTS # UR COMP ASSIST: 1 /HPF — SIGNIFICANT CHANGE UP (ref 0–4)
RH IG SCN BLD-IMP: POSITIVE — SIGNIFICANT CHANGE UP
SAO2 % BLDV: 79.8 % — SIGNIFICANT CHANGE UP (ref 67–88)
SARS-COV-2 RNA SPEC QL NAA+PROBE: SIGNIFICANT CHANGE UP
SODIUM SERPL-SCNC: 143 MMOL/L — SIGNIFICANT CHANGE UP (ref 135–145)
SP GR SPEC: 1.03 — HIGH (ref 1.01–1.02)
UROBILINOGEN FLD QL: NEGATIVE — SIGNIFICANT CHANGE UP
WBC # BLD: 7.87 K/UL — SIGNIFICANT CHANGE UP (ref 3.8–10.5)
WBC # FLD AUTO: 7.87 K/UL — SIGNIFICANT CHANGE UP (ref 3.8–10.5)
WBC UR QL: 1 /HPF — SIGNIFICANT CHANGE UP (ref 0–5)

## 2021-09-21 PROCEDURE — 82803 BLOOD GASES ANY COMBINATION: CPT

## 2021-09-21 PROCEDURE — 99285 EMERGENCY DEPT VISIT HI MDM: CPT

## 2021-09-21 PROCEDURE — 85730 THROMBOPLASTIN TIME PARTIAL: CPT

## 2021-09-21 PROCEDURE — 86901 BLOOD TYPING SEROLOGIC RH(D): CPT

## 2021-09-21 PROCEDURE — 96374 THER/PROPH/DIAG INJ IV PUSH: CPT | Mod: XU

## 2021-09-21 PROCEDURE — 84132 ASSAY OF SERUM POTASSIUM: CPT

## 2021-09-21 PROCEDURE — 87086 URINE CULTURE/COLONY COUNT: CPT

## 2021-09-21 PROCEDURE — 85018 HEMOGLOBIN: CPT

## 2021-09-21 PROCEDURE — 99284 EMERGENCY DEPT VISIT MOD MDM: CPT | Mod: 25

## 2021-09-21 PROCEDURE — 85014 HEMATOCRIT: CPT

## 2021-09-21 PROCEDURE — 84295 ASSAY OF SERUM SODIUM: CPT

## 2021-09-21 PROCEDURE — 83690 ASSAY OF LIPASE: CPT

## 2021-09-21 PROCEDURE — U0003: CPT

## 2021-09-21 PROCEDURE — 83605 ASSAY OF LACTIC ACID: CPT

## 2021-09-21 PROCEDURE — 93010 ELECTROCARDIOGRAM REPORT: CPT

## 2021-09-21 PROCEDURE — 74177 CT ABD & PELVIS W/CONTRAST: CPT | Mod: MA

## 2021-09-21 PROCEDURE — 82435 ASSAY OF BLOOD CHLORIDE: CPT

## 2021-09-21 PROCEDURE — U0005: CPT

## 2021-09-21 PROCEDURE — 74177 CT ABD & PELVIS W/CONTRAST: CPT | Mod: 26,MA

## 2021-09-21 PROCEDURE — 93005 ELECTROCARDIOGRAM TRACING: CPT

## 2021-09-21 PROCEDURE — 85610 PROTHROMBIN TIME: CPT

## 2021-09-21 PROCEDURE — 85025 COMPLETE CBC W/AUTO DIFF WBC: CPT

## 2021-09-21 PROCEDURE — 86900 BLOOD TYPING SEROLOGIC ABO: CPT

## 2021-09-21 PROCEDURE — 82330 ASSAY OF CALCIUM: CPT

## 2021-09-21 PROCEDURE — 86850 RBC ANTIBODY SCREEN: CPT

## 2021-09-21 PROCEDURE — 81001 URINALYSIS AUTO W/SCOPE: CPT

## 2021-09-21 PROCEDURE — 80053 COMPREHEN METABOLIC PANEL: CPT

## 2021-09-21 PROCEDURE — 82947 ASSAY GLUCOSE BLOOD QUANT: CPT

## 2021-09-21 RX ORDER — ACETAMINOPHEN 500 MG
650 TABLET ORAL ONCE
Refills: 0 | Status: COMPLETED | OUTPATIENT
Start: 2021-09-21 | End: 2021-09-21

## 2021-09-21 RX ORDER — ONDANSETRON 8 MG/1
4 TABLET, FILM COATED ORAL ONCE
Refills: 0 | Status: COMPLETED | OUTPATIENT
Start: 2021-09-21 | End: 2021-09-21

## 2021-09-21 RX ORDER — SODIUM CHLORIDE 9 MG/ML
1000 INJECTION INTRAMUSCULAR; INTRAVENOUS; SUBCUTANEOUS ONCE
Refills: 0 | Status: COMPLETED | OUTPATIENT
Start: 2021-09-21 | End: 2021-09-21

## 2021-09-21 RX ADMIN — SODIUM CHLORIDE 1000 MILLILITER(S): 9 INJECTION INTRAMUSCULAR; INTRAVENOUS; SUBCUTANEOUS at 08:53

## 2021-09-21 RX ADMIN — ONDANSETRON 4 MILLIGRAM(S): 8 TABLET, FILM COATED ORAL at 08:53

## 2021-09-21 RX ADMIN — Medication 650 MILLIGRAM(S): at 08:53

## 2021-09-21 NOTE — ED PROVIDER NOTE - CLINICAL SUMMARY MEDICAL DECISION MAKING FREE TEXT BOX
25 year old male with history of asthma, gastritis, and appendicitis in 12/20 treated with abx, presenting with RLQ abdominal pain and N/V x 1d. Patient appears well here, afebrile, vitals wnl. 25 year old male with history of asthma, gastritis, and appendicitis in 12/20 treated with abx, presenting with RLQ abdominal pain and N/V x 1d. Patient appears well here, afebrile, vitals wnl. Exam with focal RLQ abdominal tenderness, non-peritoneal. Very likely appy, will obtain pre-ops and rpt CT imaging, surg consult, likely admit

## 2021-09-21 NOTE — ED PROVIDER NOTE - NSFOLLOWUPINSTRUCTIONS_ED_ALL_ED_FT
You were seen in the Emergency Department for: abdominal pain, nausea    For pain/fever, you may take Tylenol (acetaminophen) 650 mg every 6 hours, OR Advil (ibuprofen) 600 mg every 8 hours.    Please follow up with Dr. Valencia as discussed within 1 week. Call the office number above to schedule an appointment.    You should return to the Emergency Department if you feel any new/worsening/persistent symptoms including but not limited to: chest pain, difficulty breathing, loss of consciousness, bleeding, uncontrolled pain, numbness/weakness of a body part

## 2021-09-21 NOTE — CONSULT NOTE ADULT - SUBJECTIVE AND OBJECTIVE BOX
HPI:  25M presenting to ED with abdominal pain that began 1 day ago.  Pain mostly in RLQ, does not radiate.  Pt had appendicitis 2020 and was treated non-operatively, received IV antibiotics in hospital and completed a course of oral antibiotics on discharge.  Had similar episode of pain several months ago and was seen in office, workup was negative.  +flatus, +BM.  Denies N/V.        PAST MEDICAL & SURGICAL HISTORY:  Asthma    Gastritis    History of appendicitis    No significant past surgical history        MEDICATIONS  (STANDING):    MEDICATIONS  (PRN):      Allergies    No Known Allergies    Intolerances    magnesium sulfate (Hypotension)      SOCIAL HISTORY:    FAMILY HISTORY:          Physical Exam:  General: NAD, resting comfortably  HEENT: NC/AT, EOMI, normal hearing, no oral lesions, no LAD, neck supple  Pulmonary: normal resp effort, CTA-B  Cardiovascular: NSR, no murmurs  Abdominal: soft, ND/NT, no organomegaly, RLQ tenderness  Extremities: WWP, normal strength, no clubbing/cyanosis/edema  Neuro: A/O x 3, CNs II-XII grossly intact, normal sensation, no focal deficits  Pulses: palpable distal pulses    Vital Signs Last 24 Hrs  T(C): 36.8 (21 Sep 2021 08:39), Max: 36.8 (21 Sep 2021 08:39)  T(F): 98.2 (21 Sep 2021 08:39), Max: 98.2 (21 Sep 2021 08:39)  HR: 63 (21 Sep 2021 08:39) (60 - 63)  BP: 110/62 (21 Sep 2021 08:39) (110/62 - 121/76)  BP(mean): --  RR: 18 (21 Sep 2021 08:39) (18 - 20)  SpO2: 100% (21 Sep 2021 08:39) (99% - 100%)    I&O's Summary          LABS:                        14.3   7.87  )-----------( 207      ( 21 Sep 2021 08:52 )             43.7     09-    143  |  107  |  13  ----------------------------<  90  4.1   |  23  |  0.92    Ca    9.7      21 Sep 2021 08:52    TPro  7.5  /  Alb  4.8  /  TBili  0.3  /  DBili  x   /  AST  21  /  ALT  40  /  AlkPhos  95  09-    PT/INR - ( 21 Sep 2021 08:52 )   PT: 12.0 sec;   INR: 1.00 ratio         PTT - ( 21 Sep 2021 08:52 )  PTT:30.7 sec  Urinalysis Basic - ( 21 Sep 2021 09:44 )    Color: Light Yellow / Appearance: Clear / S.031 / pH: x  Gluc: x / Ketone: Negative  / Bili: Negative / Urobili: Negative   Blood: x / Protein: Negative / Nitrite: Negative   Leuk Esterase: Negative / RBC: 1 /hpf / WBC 1 /HPF   Sq Epi: x / Non Sq Epi: 0 /hpf / Bacteria: Negative      CAPILLARY BLOOD GLUCOSE        LIVER FUNCTIONS - ( 21 Sep 2021 08:52 )  Alb: 4.8 g/dL / Pro: 7.5 g/dL / ALK PHOS: 95 U/L / ALT: 40 U/L / AST: 21 U/L / GGT: x             Cultures:      RADIOLOGY & ADDITIONAL STUDIES:      t< from: CT Abdomen and Pelvis w/ IV Cont (21 @ 09:32) >    EXAM:  CT ABDOMEN AND PELVIS IC                            PROCEDURE DATE:  2021            INTERPRETATION:  CLINICAL INFORMATION: Right lower quadrant abdominal pain. Evaluate for acute appendicitis. History of previous appendicitis treated with antibiotics.    COMPARISON: 2021 and 2020    CONTRAST/COMPLICATIONS:  IV Contrast: Omnipaque 350.  90 mL administered. 10 mL discarded.  Oral Contrast: None  Complications: None reported at the time of exam    PROCEDURE:  CT of the Abdomen and Pelvis was performed.  Sagittal and coronal reformats were performed.    FINDINGS:  LOWER CHEST: Within normal limits.    LIVER: Within normal limits.  BILE DUCTS: Normal caliber.  GALLBLADDER: Within normal limits.  SPLEEN: Within normal limits.  PANCREAS: Within normal limits.  ADRENALS: Within normal limits.  KIDNEYS/URETERS: Within normal limits.    BLADDER: Within normal limits.  REPRODUCTIVE ORGANS: Prostate within normal limits.    BOWEL: No bowel obstruction. Appendix is within normal limits, similar appearance to findings present on prior examination 2021.  PERITONEUM: No ascites.  VESSELS: Within normal limits.  RETROPERITONEUM/LYMPH NODES: No lymphadenopathy.  ABDOMINAL WALL: Within normal limits.  BONES: Within normallimits.    IMPRESSION:  No findings explain the symptoms of abdominal pain.    No evidence of acute appendicitis, as clinically questioned. Appearance of the appendix is unchanged compared to prior examination 2021.        --- End of Report ---                ENZO JONES MD; Attending Radiologist  This document has been electronically signed. Sep 21 2021  9:58AM

## 2021-09-21 NOTE — ED PROVIDER NOTE - PATIENT PORTAL LINK FT
You can access the FollowMyHealth Patient Portal offered by [facility] by registering at the following website: [email or web address]. By joining [employer]’s FollowMyHealth portal, you will also be able to view your health information using other applications (apps) compatible with our system.

## 2021-09-21 NOTE — ED PROVIDER NOTE - PHYSICAL EXAMINATION
Gen - NAD; well-appearing; A+Ox3   HEENT - NCAT, EOMI, moist mucous membranes  Neck - supple  Resp - CTAB  CV -  RRR  Abd - soft, ND, tender to palpation over RLQ, +Rovsing's, +Psoas, negative obturator sign; no guarding or rebound  Back - no CVA tenderness  MSK - 5/5 strength and FROM b/l UE and LE  Extrem - no LE edema/erythema/tenderness  Neuro - no focal motor or sensation deficits

## 2021-09-21 NOTE — ED ADULT NURSE NOTE - OBJECTIVE STATEMENT
25 y.o. male coming in from home for right sided abdominal pain since last night. states that he had abdominal pain like this in November when he was diagnosed with appendicitis which he was placed on antibiotics for. PMH of asthma. A&Ox3, lung sounds clear bilaterally, right sided abdominal tenderness on palpation with no radiation to the back or left side, bowel sounds present, no lower extremity edema, afebrile, vitals stable.

## 2021-09-21 NOTE — CONSULT NOTE ADULT - ASSESSMENT
A/P  24 y/o M with hx appendicitis 12/2020, now w abdominal pain    - CT shows no appendicitis, no leukocytosis  - no acute surgical intervention warranted  - may need additional workups prior to elective appendectomy  - f/u with Dr. Valencia in office 1-2 weeks  - case discussed with Dr. Annie Matute, PA-C , pager # [phone] A/P  24 y/o M with hx appendicitis 12/2020, now w abdominal pain    - no leukocytosis, CT shows no appendicitis  - no acute surgical intervention warranted  - may need additional workups prior to elective appendectomy  - f/u with Dr. Valencia in office 1-2 weeks  - discussed with Dr. Annie Matute PA-C , pager # [phone]

## 2021-09-21 NOTE — ED PROVIDER NOTE - ATTENDING CONTRIBUTION TO CARE
Pt with RLQ ab pain, noted slightly last night, much worse this AM, constant, moderate, worse with touch, associated with 2 episodes of nbnb vomiting, no fever, normal urination and bowel movements.  +td McBurney's point, no rebound.

## 2021-09-21 NOTE — ED PROVIDER NOTE - OBJECTIVE STATEMENT
25 year old male with history of asthma, gastritis, and appendicitis in 12/20 treated with abx, presenting with RLQ abdominal pain and N/V x 1d. States waking up this AM with sudden onset RLQ pain, denies f/c, CP, cough, SOB, dysuria. Did not take anything for pain. Vomited few times NBNB. Last saw Dr. Gonzales in the spring.

## 2021-09-21 NOTE — ED PROVIDER NOTE - PROGRESS NOTE DETAILS
[facility] PGY-2: Patient reassessed, pain improved, now "mild". CT negative for acute appy. On exam has minimal RLQ tenderness. Per surgery, patient to follow up with Dr. Suggs outpatient within 1 wk. No abx at this time. Pending PO trial will d/c patient.

## 2021-09-22 LAB
CULTURE RESULTS: NO GROWTH — SIGNIFICANT CHANGE UP
SPECIMEN SOURCE: SIGNIFICANT CHANGE UP

## 2021-09-29 PROBLEM — Z87.19 PERSONAL HISTORY OF OTHER DISEASES OF THE DIGESTIVE SYSTEM: Chronic | Status: ACTIVE | Noted: 2021-09-21

## 2021-10-04 ENCOUNTER — APPOINTMENT (OUTPATIENT)
Dept: SURGERY | Facility: CLINIC | Age: 26
End: 2021-10-04

## 2021-12-16 NOTE — PATIENT PROFILE ADULT - NSPROGENBLOODRESTRICT_GEN_A_NUR
[Discharge] : discharge [Bilateral] : (bilateral) [Clear] : right tympanic membrane clear [Erythema] : erythema [Purulent Effusion] : purulent effusion [NL] : no abnormal lymph nodes palpated [FreeTextEntry5] : conjunctiva minimal erythema [de-identified] : small superficial scratch under L eye none

## 2022-01-24 NOTE — ED ADULT NURSE NOTE - NS ED NURSE LEVEL OF CONSCIOUSNESS ORIENTATION
"2022       RE: Sheela Davis  : 1962   MRN: [id number]      Dear Colleague,    Thank you for referring your patient, Sheela Davis, to the [facility] EPILEPSY CARE at [facility]. Please see a copy of my visit note below.    [facility] Epilepsy Care Progress Note      Patient:  Sheela Davis  :  1962   Age:  59 year old   Today's Office Visit:  2022    Epilepsy Data:  The patient had an episode of loss of consciousness on 2019.  She was very stressed when she went to bed.  Her daughter came home and saw her in the bed doing something unusual with her hands up in the air, but she does not recall that.  Her daughter called 911, and she was taken to the hospital.  She was intubated and was placed on Keppra. EEG was inconclusive as was read as \"The patient was on propofol, and there was suppression of activities and diffuse slowing; however, there were no epileptiform discharges.\"  Brain MRI on 2019 showed no acute intracranial abnormality, asymmetric atrophy of the cerebellum, less parenchymal loss in the cerebral hemisphere, patent intracranial circulation, patent carotid and vertebral arteries.  Due to the patient's history of depression and anxiety, Keppra was switched to Depakote. The patient was extubated.  She does not recall anything during the 3 days of hospitalization.  She has been continued on Depakote since then.  She did not have any similar episodes in the past or since the first episode.  She denies a history of childhood seizures, febrile seizures, meningitis, encephalitis, family history of epilepsy or significant head trauma.     EEG on 4/15/19 showed focal slowing and occasional epileptiform discharges over the left frontotemporal region during sleep.      History of Present Illness:     Ms. Coker returns to visit today for a follow up on her epilepsy. She is taking Depakote  mg bid.    When " she tries to do something else or talk while walking, even when she walks with the walker, she may fall.      Ataxia of unknown etiology: Stable.  She is no longer following Dr. Oviedo. She is following with Dr. Klein at [facility].     Current Outpatient Medications   Medication Sig Dispense Refill     alendronate (FOSAMAX) 70 MG tablet Take 1 tablet (70 mg) by mouth every 7 days Take 60 minutes before am meal with 8 oz. water. Remain upright for 30 minutes. 12 tablet 3     alendronate (FOSAMAX) 70 MG tablet Take 1 tablet (70 mg) by mouth every 7 days Take 60 minutes before am meal with 8 oz. water. Remain upright for 30 minutes.       calcium citrate (CITRACAL) 950 (200 Ca) MG tablet Take 1 tablet (950 mg) by mouth 3 times daily 270 tablet 3     Calcium Citrate-Vitamin D (CALCIUM CITRATE +D PO) Take by mouth daily        carbidopa-levodopa (SINEMET)  MG tablet Take 1 tablet by mouth 2 times daily       coenzyme Q-10 capsule TAKE THREE CAPSULES BY MOUTH EVERY MORNING       divalproex sodium extended-release (DEPAKOTE ER) 500 MG 24 hr tablet Take 1 tablet (500 mg) by mouth 2 times daily 62 tablet 0     escitalopram (LEXAPRO) 20 MG tablet Take 20 mg by mouth daily       fish oil-omega-3 fatty acids 1000 MG capsule Take 2 g by mouth daily       levothyroxine (SYNTHROID/LEVOTHROID) 100 MCG tablet Take one tablet daily, 5 days a week, and 1/2 tablet, one day a week.  Overdue for labs 60 tablet 3     multivitamin (CENTRUM SILVER) tablet Take 1 tablet by mouth daily       multivitamin, therapeutic with minerals (MULTI-VITAMIN) TABS Take 1 tablet by mouth daily       Omega-3 Fatty Acids (FISH OIL PO)        polyethylene glycol (MIRALAX) 17 GM/Dose powder Take 8.5 g by mouth       baclofen (LIORESAL) 20 MG tablet Take 1 tablet (20 mg) by mouth 3 times daily (Patient not taking: Reported on 1/24/2022) 90 tablet 2     Naproxen Sodium (ALEVE PO) Take 220 mg by mouth as needed for moderate pain  "(Patient not taking: Reported on 1/24/2022)       omeprazole (PRILOSEC) 10 MG CR capsule Take 2 capsules (20 mg) by mouth daily Take by mouth 30-60 minutes before a meal. (Patient not taking: Reported on 1/24/2022) 30 capsule 0        Perceived AED Side Effects:  No    Medication Notes:        AED Medication Compliance:  compliant most of the time    Exam:    /73   Pulse 74   Temp 97.1  F (36.2  C) (Temporal)   Ht 5' 2\" (157.5 cm)   Wt 104 lb 12.8 oz (47.5 kg)   BMI 19.17 kg/m       Wt Readings from Last 5 Encounters:   01/24/22 104 lb 12.8 oz (47.5 kg)   11/24/21 104 lb 8 oz (47.4 kg)   02/06/20 110 lb (49.9 kg)   11/15/19 108 lb (49 kg)   07/22/19 109 lb 3.2 oz (49.5 kg)     General Appearance: Alert, awake, cooperative, pleasant, NAD  Gait: spastic   Attention Span:  Normal  Language/speech: no aphasia or dysarthria  Extraocular Movements:  Normal  Facial Strength:  Normal  Tongue Strength:  Normal  Motor Exam: motor strength 5/5 except LLE: 4+/5, increased tone in lower extremities.     Assessment and Plan:    1. Focal epilepsy: the patient did not have any seizures since starting Depakote. Currently on Depakote 500 mg bid, no known side effects.     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 She is following at [facility]. I suggested to discuss restarting Baclofen with her neurologist at [facility].      - Continue Depakote  mg bid  - Obtain Depakote level   - RTC in 1 year      As described above, I met with the patient for ~25 minutes and during this time counseling was within 50% of the visit time.  Alice Joshi MD                          Again, thank you for allowing me to participate in the care of your patient.      Sincerely,    Alice Joshi MD      " Oriented - self; Oriented - place; Oriented - time

## 2022-11-08 NOTE — CONSULT NOTE ADULT - HEMATOLOGY/LYMPHATICS
negative Erivedge Counseling- I discussed with the patient the risks of Erivedge including but not limited to nausea, vomiting, diarrhea, constipation, weight loss, changes in the sense of taste, decreased appetite, muscle spasms, and hair loss.  The patient verbalized understanding of the proper use and possible adverse effects of Erivedge.  All of the patient's questions and concerns were addressed.

## 2022-11-23 NOTE — ED ADULT NURSE NOTE - NSIMPLEMENTINTERV_GEN_ALL_ED
DNR
Implemented All Universal Safety Interventions:  [place] to call system. Call bell, personal items and telephone within reach. Instruct patient to call for assistance. Room bathroom lighting operational. Non-slip footwear when patient is off stretcher. Physically safe environment: no spills, clutter or unnecessary equipment. Stretcher in lowest position, wheels locked, appropriate side rails in place.

## 2024-05-22 NOTE — ED PROVIDER NOTE - PRINCIPAL DIAGNOSIS
Nausea & vomiting Assistance OOB with selected safe patient handling equipment if applicable/Assistance with ambulation/Communicate risk of Fall with Harm to all staff, patient, and family/Monitor gait and stability/Provide patient with walking aids/Provide visual cue: red socks, yellow wristband, yellow gown, etc/Reinforce activity limits and safety measures with patient and family/Bed in lowest position, wheels locked, appropriate side rails in place/Call bell, personal items and telephone in reach/Instruct patient to call for assistance before getting out of bed/chair/stretcher/Non-slip footwear applied when patient is off stretcher/[place] to call system/Physically safe environment - no spills, clutter or unnecessary equipment/Purposeful Proactive Rounding/Room/bathroom lighting operational, light cord in reach

## 2025-06-18 NOTE — DISCHARGE NOTE PROVIDER - NSDCCPGOAL_GEN_ALL_CORE_FT
ED Attending Note      Patient : Juana Rivera Age: 73 year old Sex: female   MRN: [id number] Encounter Date: 6/17/2025    History     Chief Complaint   Patient presents with    Weakness    Shortness of Breath     History of Present Illness    Juana Rivera is a 73 year old female with past medical history of hypertension, hyperlipidemia, recently admitted for hyponatremia and discharged earlier today, presents to the emergency department for persistent weakness, altered mental status, and inability to eat.  Patient discharged earlier today after be admitted for hyponatremia.  Sodium reportedly back up into the high 120s.   reports that he picked up patient's prescriptions helped her into the house where she sat on the couch and slept for several hours.  Patient did not even get up to use the bathroom.  Family ordered pizza for dinner and patient was not able to eat any of it.  Patient continued to be altered/weak.  Family unable to get patient up and ultimately called EMS to bring patient back to the emergency department for further evaluation.  Family incredibly concerned as patient was discharged today despite not being back to baseline.  Family reports patient has not eaten a proper meal over the past few weeks.     I have reviewed Juana Rivera's previous discharge summary from 6/16/2025.  Note Review Summary: 73-year-old female presenting with increased weakness over the past week.  Patient is found to be significantly hyponatremic in the emergency room.  She states that she developed a sinus infection and was treated as an outpatient and her symptoms have now improved.  Patient denies being on a thiazide or diuretic therapy.  Nephrology is with consulted for hyponatremia.   Hospital Course  Hyponatremia  -Patient with polydipsia.  Repeat sodium slowly increasing.  Nephrology on board.    Clear for DC today   DC on ureea   Dysphagia  Per  patient has been having trouble swallowing and has not had  adequate p.o. intake.  GI consulted.    EGD completed   Metabolic acidosis  Secondary starvation continuous  - IV thiamine and dextrose      Past History            Past Medical History:   Diagnosis Date    Essential (primary) hypertension     High cholesterol     Past Surgical History:   Procedure Laterality Date    JOINT REPLACEMENT      KNEE SURGERY      TONSILLECTOMY               Social History     Tobacco Use    Smoking status: Never    Smokeless tobacco: Never   Vaping Use    Vaping status: never used   Substance Use Topics    Alcohol use: Yes     Alcohol/week: 1.0 standard drink of alcohol     Types: 1 Glasses of wine per week    Drug use: Never    Family History   Problem Relation Age of Onset    Patient is unaware of any medical problems Mother     Patient is unaware of any medical problems Father              Prior to Admission medications    Medication Sig Start Date End Date Taking? Authorizing Provider   Urea 15 GM/SCOOP Powder Mix 1 scoop (15 g) in 3 to 4 ounces of liquid and drink by mouth daily. 6/17/25   Nelson De Leon MD   pantoprazole (PROTONIX) 40 MG tablet Take 1 tablet by mouth daily (before breakfast). 6/18/25 7/18/25  Alexandro Mccullough MD   verapamil (CALAN SR) 180 MG CR tablet Take 1 tablet by mouth daily. Begin taking on June 18, 2025. 6/18/25 7/18/25  Alexandro Mccullough MD   simvastatin (ZOCOR) 10 MG tablet TAKE 1 TABLET BY MOUTH EVERY  NIGHT 4/17/25   Hulesch, William S, MD   VITAMIN E PO Take 400 mg by mouth daily. 7/30/19   Provider, Outside   GARLIC PO Take 1,000 mg by mouth daily. 7/30/19   Provider, Outside   Cholecalciferol (VITAMIN D) 50 mcg (2,000 units) tablet Take 50 mcg by mouth daily.    Provider, Outside   aspirin (ECOTRIN) 325 MG EC tablet Take 325 mg by mouth daily. Once daily    Provider, Outside   DAILY MULTIPLE VITAMINS Tab Take 1 tablet by mouth daily.    Provider, Outside   Cyanocobalamin (B-12) 1000 MCG Cap Take 1,000 mcg by mouth daily.    Provider, Outside     Allergies   Allergen Reactions    No Known Allergies Other (See Comments)          Review of Systems     Review of Systems   Constitutional:  Positive for fatigue.   Gastrointestinal:         Decreased appetite   Neurological:         Confusion     Physical Exam     ED Triage Vitals   ED Triage Vitals Group      Temp 06/17/25 2036 100 °F (37.8 °C)      Heart Rate 06/17/25 2036 (!) 114      Resp 06/17/25 2036 (!) 22      BP 06/17/25 2036 (!) 156/93      SpO2 06/17/25 2036 95 %      EtCO2 mmHg --       Height 06/18/25 0133 5' 2\" (1.575 m)      Weight 06/17/25 2036 184 lb 15.5 oz (83.9 kg)      Weight Scale Used 06/18/25 0133 Scale in bed      BMI (Calculated) 06/18/25 0133 32.1      IBW/kg (Calculated) 06/18/25 0133 50.1        Physical Exam  Vitals reviewed.   Constitutional:       Appearance: She is ill-appearing.   HENT:      Head: Normocephalic and atraumatic.      Mouth/Throat:      Mouth: Mucous membranes are moist.   Eyes:      Extraocular Movements: Extraocular movements intact.      Pupils: Pupils are equal, round, and reactive to light.   Cardiovascular:      Rate and Rhythm: Normal rate and regular rhythm.      Pulses: Normal pulses.      Heart sounds: Normal heart sounds.   Pulmonary:      Effort: Pulmonary effort is normal.      Breath sounds: Normal breath sounds.   Abdominal:      General: There is no distension.      Palpations: Abdomen is soft.      Tenderness: There is no abdominal tenderness.   Skin:     General: Skin is warm and dry.      Capillary Refill: Capillary refill takes less than 2 seconds.   Neurological:      General: No focal deficit present.      Mental Status: She is alert and oriented to person, place, and time.      Comments: Benign neuro exam.  Cranial nerves II through XII grossly intact.  5/5 strength bilateral upper and lower extremities.  Sensation intact to bilateral upper and lower extremities.     Psychiatric:         Mood and Affect: Mood normal.         Behavior: Behavior  To get better and follow your care plan as instructed. normal.       Lab Results     Results for orders placed or performed during the hospital encounter of 06/17/25   Comprehensive Metabolic Panel    Specimen: Blood, Venous   Result Value Ref Range    Fasting Status      Sodium 129 (L) 135 - 145 mmol/L    Potassium 3.4 3.4 - 5.1 mmol/L    Chloride 97 97 - 110 mmol/L    Carbon Dioxide 22 21 - 32 mmol/L    Anion Gap 13 7 - 19 mmol/L    Glucose 92 70 - 99 mg/dL    BUN 13 6 - 20 mg/dL    Creatinine 0.72 0.51 - 0.95 mg/dL    Glomerular Filtration Rate 89 >=60    BUN/Cr 18 7 - 25    Calcium 8.7 8.4 - 10.2 mg/dL    Bilirubin, Total 1.2 (H) 0.2 - 1.0 mg/dL    GOT/AST 25 <=37 Units/L    GPT/ALT 14 <64 Units/L    Alkaline Phosphatase 110 45 - 117 Units/L    Albumin 2.6 (L) 3.4 - 5.0 g/dL    Protein, Total 6.3 (L) 6.4 - 8.2 g/dL    Globulin 3.7 2.0 - 4.0 g/dL    A/G Ratio 0.7 (L) 1.0 - 2.4   CBC with Automated Differential (performable only)    Specimen: Blood, Venous   Result Value Ref Range    WBC 20.8 (H) 4.2 - 11.0 K/mcL    RBC 3.88 (L) 4.00 - 5.20 mil/mcL    HGB 12.9 12.0 - 15.5 g/dL    HCT 36.9 36.0 - 46.5 %    MCV 95.1 78.0 - 100.0 fl    MCH 33.2 26.0 - 34.0 pg    MCHC 35.0 32.0 - 36.5 g/dL    RDW-CV 12.2 11.0 - 15.0 %    RDW-SD 42.3 39.0 - 50.0 fL     140 - 450 K/mcL    NRBC 0 <=0 /100 WBC    Neutrophil, Percent 82 %    Lymphocytes, Percent 6 %    Mono, Percent 11 %    Eosinophils, Percent 0 %    Basophils, Percent 0 %    Immature Granulocytes 1 %    Absolute Neutrophils 17.2 (H) 1.8 - 7.7 K/mcL    Absolute Lymphocytes 1.2 1.0 - 4.0 K/mcL    Absolute Monocytes 2.2 (H) 0.3 - 0.9 K/mcL    Absolute Eosinophils  0.0 0.0 - 0.5 K/mcL    Absolute Basophils 0.1 0.0 - 0.3 K/mcL    Absolute Immature Granulocytes 0.1 0.0 - 0.2 K/mcL   Lactic Acid Venous With Reflex    Specimen: Blood, Venous   Result Value Ref Range    Lactate, Venous 1.3 0.0 - 2.0 mmol/L   Procalcitonin    Specimen: Blood, Venous   Result Value Ref Range    Procalcitonin 0.59 (H) <0.10 ng/mL   Prothrombin  Time (INR/PT)    Specimen: Blood, Venous   Result Value Ref Range    Protime- PT 13.0 (H) 9.7 - 11.8 sec    INR 1.2     Partial Thromboplastin Time (PTT)    Specimen: Blood, Venous   Result Value Ref Range    PTT 34 (H) 22 - 32 sec   Urinalysis & Reflex Microscopy With Culture If Indicated    Specimen: Urine clean catch   Result Value Ref Range    COLOR, URINALYSIS Brown     APPEARANCE, URINALYSIS Turbid     GLUCOSE, URINALYSIS Negative Negative mg/dL    BILIRUBIN, URINALYSIS Negative Negative    KETONES, URINALYSIS Negative Negative mg/dL    SPECIFIC GRAVITY, URINALYSIS >1.030 (H) 1.005 - 1.030    OCCULT BLOOD, URINALYSIS Large (A) Negative    PH, URINALYSIS 6.0 5.0 - 7.0    PROTEIN, URINALYSIS 100 (A) Negative mg/dL    UROBILINOGEN, URINALYSIS 0.2 0.2, 1.0 mg/dL    NITRITE, URINALYSIS Positive (A) Negative    LEUKOCYTE ESTERASE, URINALYSIS Large (A) Negative    SQUAMOUS EPITHELIAL, URINALYSIS None Seen None Seen, 1 to 5 /hpf    ERYTHROCYTES, URINALYSIS >100 (A) None Seen, 1 to 2 /hpf    LEUKOCYTES, URINALYSIS >100 (A) None Seen, 1 to 5 /hpf    BACTERIA, URINALYSIS Few (A) None Seen /hpf    HYALINE CASTS, URINALYSIS None Seen None Seen, 1 to 5 /lpf   TROPONIN I, HIGH SENSITIVITY    Specimen: Blood, Venous   Result Value Ref Range    Troponin I, High Sensitivity 93 (HH) <52 ng/L   NT proBNP    Specimen: Blood, Venous   Result Value Ref Range    NT-proBNP 26,858 (H) <=125 pg/mL   Blood Culture    Specimen: Blood, Line   Result Value Ref Range    Culture, Blood or Bone Marrow No Growth <24 hours    Blood Culture    Specimen: Blood, Line   Result Value Ref Range    Culture, Blood or Bone Marrow No Growth <24 hours      EKG Results     EKG at 2242   Ventricular rate: 99bpm, MO interval: 182ms, QRS duration: 82ms, QTc: 577ms, NSR, right axis deviation    EKG interpreted by the emergency department physician    Radiology Results     Imaging Results              CT CHEST ABDOMEN PELVIS W CONTRAST (Final result)  Result  time 06/17/25 23:35:08      Final result                   Impression:    1.   Findings compatible with left pyelonephritis, ureteritis, and cystitis  with severe overall inflammation.  2.   Mild left hydronephrosis. No urinary tract stones.  3.   Mild left and mild to moderate right pleural effusions with associated  passive atelectasis.  4.   Mild cardiomegaly. Severe coronary calcification.  5.   Fluid retention within the esophagus could be from underlying  gastroesophageal reflux disease.  6.   Other findings as above.        Electronically Signed by: HEMAL RENTERIA M.D.   Signed on: 6/17/2025 11:35 PM   Workstation ID: [id number]               Narrative:    EXAM: CT CHEST ABDOMEN PELVIS W CONTRAST    CLINICAL INDICATION: Leukocytosis. Chest pain. Abdominal pain. No appetite.    TECHNIQUE: CT of the chest, abdomen, and pelvis was performed with  contrast. 80 cc Omnipaque 350 was infused intravenously. Iterative  reconstruction was used as a radiation dose reduction technique.    COMPARISON: None.    FINDINGS:    Chest:  No enlargement of axillary lymph nodes. Increased number of prominent lymph  nodes in the mediastinum appear to be reactive. No hilar lymphadenopathy.  There is no gross acute vascular process. There is severe coronary  calcification. Mild and global cardiomegaly. Trace pericardial effusion.  There is no vascular dilatation within the chest. Mild left and mild to  moderate right pleural effusions. There is fluid retention within the  esophagus. Findings could be from underlying gastroesophageal reflux  disease.    No acute process paraspinal soft tissues.    The central airways are patent. Borderline minimal hydrostatic pulmonary  edema. There is large left and moderate right lower lobe passive  atelectasis secondary to compression by the pleural effusion. Mild  atelectasis elsewhere within the lungs. Consolidation within the lungs is  not seen. There is no lung mass. No pneumothorax. Small  nodule subpleural  anterior right upper lobe seen on series 3, image 36. This is favored to be  benign given the small size.    No concerning focal bone lesion. There is no acute fracture. Mild to  moderate convex right curvature of the spine keeping with scoliosis.  Osteoporosis. There are degenerative changes of the cervical spine. There  may be diffuse idiopathic skull hyperostosis.    Abdomen and pelvis:  No hepatomegaly. No focal liver lesion. No calcified cholelithiasis. No  cholecystitis. There is no abnormality of the spleen. There is no  abnormality of the pancreas. Subtle nodularity bilateral adrenal glands.  Benign adenomas left greater than right.    The right kidney is unremarkable. There is left perinephric poorly  organized fluid/edema. No urinary tract stones. Diminished left renal  nephrogram slightly when compared to the right. There is mild left  hydronephrosis. There is urothelial thickening of the left renal collecting  system and left ureter. There is mild left periureteral edema. Findings are  compatible with left pyelonephritis, ureteritis, and with findings also  consistent with cystitis. Specifically, the bladder wall is concentrically  thickened with adjacent fat stranding and mucosal hyperenhancement. No  focal lesion of the bladder.    No focal renal lesions.    No soft tissue mass. No adenopathy. No abdominal aortic aneurysm. Mild  calcified arterial disease.    Unremarkable uterus and adnexal regions.    No ascites. There is retroperitoneal edema reactive to the urinary tract  abnormality as described above.    There is severe colonic diverticulosis but no diverticulitis. There is no  dilation of bowel to suggest bowel obstruction. No bowel inflammatory  change. There is a distal appendiceal endoclips. No evidence for  appendicitis. There is rectus diastasis. No acute process in the paraspinal  soft tissues. No acute vascular process. Scattered mild subcutaneous edema  of the body wall  eccentric inferiorly.    No concerning focal bone lesion. Mild to moderate convex left curvature of  the lumbar spine keeping scoliosis. There is osteoporosis. Diffuse  idiopathic skull hyperostosis. No focal lytic or blastic bone lesion. There  are reactive lymph nodes at the right groin.                                       CT HEAD WO CONTRAST (Final result)  Result time 06/17/25 23:46:06      Final result                   Impression:    1.   No acute intracranial abnormality.  2.   Mild chronic microvascular ischemic change.  3.   Moderate cortical brain volume loss for age.  4.   Severely carious right maxillary third molar tooth. Outpatient dental  consultation recommended.  5.   Other findings as above.        Electronically Signed by: HEMAL RENTERIA M.D.   Signed on: 6/17/2025 11:46 PM   Workstation ID: [id number]               Narrative:    CT HEAD WO CONTRAST    HISTORY: Altered mental status.       COMPARISON: CT head 6/12/2025    TECHNIQUE: Routine CT of the brain without IV contrast. Iterative  reconstruction technique was utilized as radiation dose reduction strategy  in this patient.       RESULT:    CT BRAIN:      Bilateral dental amalgam.    Calcifications bilateral juxtasellar internal carotid arteries.    No acute brain hemorrhage. No acute infarct. There is no extra-axial fluid  collection. No hydrocephalus. No brain mass. No mass effect. Mild chronic  microvascular ischemic change. Moderate cortical brain volume loss for age.  Moderate rightward nasal septal deviation. Moderate bilateral cerumen.  Mastoid air cells are clear. No skull fracture. No concerning focal bone  lesion. Mild right and minimal left maxillary sinus mucosal thickening.  Small fluid right maxillary sinus. Minimal mucosal thickening bilateral  ethmoid air cells. Osteoarthritis anterior atlantodental interval. 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 Severely carious right maxillary  third molar tooth. Bilateral  third mandibular molar teeth are impacted.  Outpatient dental consultation recommended. Severe left TMJ osteoarthritis.                                       XR CHEST PA OR AP 1 VIEW (Final result)  Result time 06/17/25 22:20:30      Final result                   Impression:    1.   Mild bilateral pleural effusions with bibasilar atelectasis.  Underlying consolidation not excluded.  2.   Cardiomegaly and pulmonary arterial hypertension.        Electronically Signed by: HEMAL RENTERIA M.D.   Signed on: 6/17/2025 10:20 PM   Workstation ID: [id number]               Narrative:    EXAM: XR CHEST AP OR PA    CLINICAL INDICATION: Fever, weakness, shortness of breath, fever.    COMPARISON: None.    FINDINGS:    AP portable upright chest. Enlarged main pulmonary artery in keeping with  pulmonary arterial hypertension. At least mild cardiomegaly. Mild convex  right curvature of the spine. Mild bilateral pleural effusions. Atelectasis  within the bilateral lower lungs. Underlying consolidation within the lower  lungs not excluded. Mid and upper lungs are clear.    No pneumothorax. Osteopenia.                                      Radiology findings were reviewed with the patient. If incidental findings were noted on imaging that need follow up, they were discussed with the patient.    Procedures     Procedures    ED Course     Vitals:    06/17/25 2238 06/18/25 0029 06/18/25 0133 06/18/25 0200   BP: (!) 142/75 115/89 139/84    BP Location:       Patient Position:       Pulse: 99 99 (!) 107 (!) 102   Resp: (!) 33 18 (!) 24    Temp:   98.7 °F (37.1 °C)    TempSrc:   Oral    SpO2: 97% 95% 94%    Weight:   79.6 kg (175 lb 7.8 oz)    Height:   5' 2\" (1.575 m)        ED Medication Orders (From admission, onward)      Ordered Start     Status Ordering Provider    06/18/25 0045 06/18/25 0100  aspirin suppository 300 mg  ONCE         Last MAR action: Given OSITEMARCOS BARBOZA A    06/18/25 0000 06/18/25 0015  furosemide (LASIX  INJECT) injection 40 mg  ONCE         Last MAR action: Given OSITELUHELDERTI A    06/18/25 0000 06/18/25 0015  cefTRIAXone (ROCEPHIN) 2,000 mg in sterile water (preservative free) IV syringe  (cefTRIAXone (ROCEPHIN) IV)  ONCE         Last MAR action: Given OSITELU, OLUWARANTI A            ED consults done in the ED course    Radiology Review: I have independently interpreted the Chest X-Ray and have found Pleural Effusion, CT Head and have found No acute disease, CT Chest and have found Pleural effusion bilaterally,  cardiomegaly                                                                                       , and CT Abdomen and have found L pyelonephritis.  I am awaiting on the final radiology read.      IV was established.  Pulse oximetry on room air is interpreted as 95%-normal  Cardiac telemetry tracing by my interpretation 99bpm-NSR.    Cardiac monitor was ordered due to elevated troponin and to monitor the patient for dysrhythmia    MDM     Medical Decision Making  73-year-old female with past medical history of hypertension, hyperlipidemia, recently admitted for hyponatremia and discharged earlier today, who presents to the emergency department for persistent weakness, altered mental status, and inability to eat.  On initial arrival patient found to be nearly febrile at 100 °F, increased respiratory rate, with otherwise stable vital signs.  Physical exam significant for ill-appearing female.  Alert and oriented x 3.  Patient denies any complaints but family at bedside reports that she is significantly off from her baseline.  Patient just discharged today for hyponatremia.    Symptoms do not appear to be secondary to hyponatremia given that it  has improved.      Assessment & Plan        ED Course as of 06/18/25 0304   Wed Jun 18, 2025   0012 Leukocytosis to 20.8.  No anemia or thrombocytopenia.  Left shift with absolute neutrophil count of 17.2.  CMP with mild hyponatremia with sodium of 129.   Significantly improved from the patient was admitted last week.  Lactic acid within normal limits.  Troponin minimally elevated at 93.  Patient denies chest pain.  BNP significantly evaded at 26,000 some component likely secondary to.  Fluid administration for severe hyponatremia during previous admission.  Pro-Nelson elevated at 0.59.      Troponin elevation likely secondary to type II demand ischemia from infection and fluid overload.  EKG without severe ischemic changes.    CT head without any acute intracranial pathology.  CT chest abdomen pelvis demonstrates mild left and mild to moderate right pleural effusions with associated compressive atelectasis.  Findings compatible with left pyelonephritis, ureteritis, and cystitis with severe overall inflammation.  Mild cardiomegaly with severe coronary calcification noted.  Fluid retention within esophagus could be from underlying GERD.    Will obtain urinalysis.  Will cover with antibiotics.  Will give dose of Lasix given elevated BNP and bilateral pleural effusions.  Will discuss case with cardiology and hospitalist for admission. [OO]   0045 Discussed case with Dr. Rivera, cardiology, who was made aware of patient's labs, imaging, and clinical condition. They will consult on case while hospitalized. Communication via perfect serve.   [OO]   0053 Discussed case with Dr. Rivera, cardiology, who was made aware of patient's labs, imaging, and clinical condition. They will consult on case while hospitalized. Communication via perfect serve.   [OO]   0054 Discussed case with Dr. Mccracken, hospitalist, who was made aware of pt's labs, imaging, clinical condition. They will accept care of pt while in the hospital. Communication via perfect serve.     I have reviewed all diagnostic test results and discussed them and their implications with patient.  The patient and family had the opportunity to as questions all of which were answered to their satisfaction.    [OO]   0101 UA  with evidence of infection. Nitrite and leuk esterase positive. Given ceftriaxone. UCx in process.  [OO]      ED Course User Index  [OO] Ameya Sanchez MD      Patient's plan of care - including HPI, physical examination findings, workup, diagnosis, differential diagnosis, and treatment plan - was discussed with the patient and any family members who were available for discussion. If the patient is being discharged home, it was recommended they closely follow up with their primary care physician in the next 24 to 48 hours and return immediately to the ER if there are any new, worsening, or concerning symptoms. If the patient is being admitted or transferred, this was also discussed. All further questions were answered at this time. Patient and any available family members verbalized understanding and agreement with the plan.     In addition to history obtained from the patient, I have obtained additional information from available independent historians such as family members and/or visitors.  In addition, I have reviewed prior external records from each unique source, EMS records and any documents or data that accompanied the patient to the emergency department today.  In consideration of this patient’s care, I have considered the need for laboratory or imaging exams, the possibility of hospitalization as well any prescription medications such as antibiotics and pain medication.      I have discussed the patient’s care with other providers including but not limited to PharmD, PA, ERT, RN and MDs when appropriate.     Clinical Impression     ED Diagnosis   1. Pyelonephritis, acute        2. Dysphagia, unspecified type        3. Altered mental status, unspecified altered mental status type        4. Hypervolemia, unspecified hypervolemia type        5. Elevated troponin           Disposition        Admit 6/18/2025 12:54 AM  Telemetry Bed?: Yes  Admitting Physician: LUCAS ALLISON [[id number]]  Is this a  telephone or verbal order?: This is a telephone order from the admitting physician    Ameya Sanchez MD   6/18/2025   [facility]  [facility]           Ameya Sanchez MD  06/18/25 3269